# Patient Record
Sex: MALE | Race: WHITE | NOT HISPANIC OR LATINO | Employment: FULL TIME | URBAN - METROPOLITAN AREA
[De-identification: names, ages, dates, MRNs, and addresses within clinical notes are randomized per-mention and may not be internally consistent; named-entity substitution may affect disease eponyms.]

---

## 2017-02-09 ENCOUNTER — OFFICE VISIT (OUTPATIENT)
Dept: FAMILY MEDICINE | Facility: CLINIC | Age: 47
End: 2017-02-09
Payer: COMMERCIAL

## 2017-02-09 VITALS
WEIGHT: 212.2 LBS | SYSTOLIC BLOOD PRESSURE: 123 MMHG | HEART RATE: 62 BPM | BODY MASS INDEX: 29.71 KG/M2 | HEIGHT: 71 IN | TEMPERATURE: 96.7 F | DIASTOLIC BLOOD PRESSURE: 82 MMHG

## 2017-02-09 DIAGNOSIS — G43.109 MIGRAINE WITH AURA AND WITHOUT STATUS MIGRAINOSUS, NOT INTRACTABLE: ICD-10-CM

## 2017-02-09 DIAGNOSIS — L91.8 SKIN TAG: ICD-10-CM

## 2017-02-09 DIAGNOSIS — S89.91XA RIGHT KNEE INJURY, INITIAL ENCOUNTER: Primary | ICD-10-CM

## 2017-02-09 PROCEDURE — 99213 OFFICE O/P EST LOW 20 MIN: CPT | Mod: 25 | Performed by: PHYSICIAN ASSISTANT

## 2017-02-09 PROCEDURE — 11200 RMVL SKIN TAGS UP TO&INC 15: CPT | Performed by: PHYSICIAN ASSISTANT

## 2017-02-09 RX ORDER — SUMATRIPTAN 100 MG/1
100 TABLET, FILM COATED ORAL
Qty: 9 TABLET | Refills: 11 | Status: SHIPPED | OUTPATIENT
Start: 2017-02-09 | End: 2018-02-15

## 2017-02-09 NOTE — PROGRESS NOTES
"  SUBJECTIVE:                                                    Mazin Herbert is a 46 year old male who presents to clinic today for the following health issues:      Medication Followup of SUMAtriptan (IMITREX) 100 MG tablet    Taking Medication as prescribed: yes-takes PRN; Getting migraines \"twice a month, up to 8 times a month\"     Side Effects:  None    Medication Helping Symptoms:  yes   Generally averages 2 per month but varies.  With dark aura.    Referral Request   Right knee pain;   Recent injury from exercising.   \"cracking\" and \"popping\" with movement  No pain; no causing interference with ADL's     Doesn't recall specifics of injuring knee, was wt lifting with leg wt machine and was drinking at the time.  Tiny bit of pain but mostly the sounds, and maybe feels slightly weaker.    -Pt offered the VENECIA/Phq9 and declined.   Mood doing well.  Doesn't drink typically - none for the 6 mo prior to this knee injury.  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    -Offered flu vaccine and pt declined     -Pt also mentioned that he would like skin tags removed today.   Bother him with toweling off, working out, feeling things rub.    Problem list and histories reviewed & adjusted, as indicated.  Additional history: as documented    Problem list, Medication list, Allergies, and Medical/Social/Surgical histories reviewed in EPIC and updated as appropriate.    ROS:  Constitutional, musculoskeletal, neuro, psych systems are negative, except as otherwise noted.    OBJECTIVE:                                                    /82 mmHg  Pulse 62  Temp(Src) 96.7  F (35.9  C) (Oral)  Ht 5' 10.5\" (1.791 m)  Wt 212 lb 3.2 oz (96.253 kg)  BMI 30.01 kg/m2  Body mass index is 30.01 kg/(m^2).  GENERAL: healthy, alert and no distress  SKIN: 5 skin tags R axilla, 3 L axilla, 1 L upper arm.  All are tiny, < 0.3 cm     ASSESSMENT/PLAN:                                   "                      ICD-10-CM    1. Right knee injury, initial encounter S89.91XA ORTHO  REFERRAL   2. Migraine with aura and without status migrainosus, not intractable G43.109 SUMAtriptan (IMITREX) 100 MG tablet   3. Skin tag L91.8 REMOVAL OF SKIN TAGS, FIRST 15   Skin tags cleansed with alcohol.  Topical anesthesia with ethyl chloride.  Removed by shave technique.  Patient declines bandaging.    Patient Instructions   Refilled migraine med    Sports medicine referral - they call you  You could give this a few weeks to see if symptoms resolve      Skin tags removed today        Karen Marquez PA-C  [facility]

## 2017-02-09 NOTE — PATIENT INSTRUCTIONS
Refilled migraine med    Sports medicine referral - they call you  You could give this a few weeks to see if symptoms resolve      Skin tags removed today

## 2017-02-09 NOTE — MR AVS SNAPSHOT
After Visit Summary   2/9/2017    Mazin Herbret    MRN: [id number]           Patient Information     Date Of Birth          1970        Visit Information        Provider Department      2/9/2017 11:20 AM Karen Marquez PA-C [facility]        Today's Diagnoses     Right knee injury, initial encounter    -  1     Migraine with aura and without status migrainosus, not intractable         Skin tag           Care Instructions    Refilled migraine med    Sports medicine referral - they call you  You could give this a few weeks to see if symptoms resolve      Skin tags removed today        Follow-ups after your visit        Additional Services     ORTHO  REFERRAL       [facility] is referring you to the Orthopedic  Services at [facility].       The  Representative will assist you in the coordination of your Orthopedic and Musculoskeletal Care as prescribed by your physician.    The  Representative will call you within 1 business day to help schedule your appointment, or you may contact the  Representative at:    All areas ~ [phone]     Type of Referral : Non Surgical       Timeframe requested: Routine    Coverage of these services is subject to the terms and limitations of your health insurance plan.  Please call member services at your health plan with any benefit or coverage questions.      If X-rays, CT or MRI's have been performed, please contact the facility where they were done to arrange for , prior to your scheduled appointment.  Please bring this referral request to your appointment and present it to your specialist.                  Who to contact     If you have questions or need follow up information about today's clinic visit or your schedule please contact [facility] directly at [phone].  Normal or non-critical lab and imaging results  "will be communicated to you by MyChart, letter or phone within 4 business days after the clinic has received the results. If you do not hear from us within 7 days, please contact the clinic through [employer] or phone. If you have a critical or abnormal lab result, we will notify you by phone as soon as possible.  Submit refill requests through [employer] or call your pharmacy and they will forward the refill request to us. Please allow 3 business days for your refill to be completed.          Additional Information About Your Visit        [employer] Information     [employer] lets you send messages to your doctor, view your test results, renew your prescriptions, schedule appointments and more. To sign up, go to [email or web address] . Click on \"Log in\" on the left side of the screen, which will take you to the Welcome page. Then click on \"Sign up Now\" on the right side of the page.     You will be asked to enter the access code listed below, as well as some personal information. Please follow the directions to create your username and password.     Your access code is: [email or web address]  Expires: 5/10/2017 11:46 AM     Your access code will  in 90 days. If you need help or a new code, please call your [place] clinic or [phone].        Care EveryWhere ID     This is your Care EveryWhere ID. This could be used by other organizations to access your [place] medical records  [id number]        Your Vitals Were     Pulse Temperature Height BMI (Body Mass Index)          62 96.7  F (35.9  C) (Oral) 5' 10.5\" (1.791 m) 30.01 kg/m2         Blood Pressure from Last 3 Encounters:   17 123/82   07/27/15 110/82   14 115/81    Weight from Last 3 Encounters:   17 212 lb 3.2 oz (96.253 kg)   07/27/15 215 lb (97.523 kg)   14 222 lb 6.4 oz (100.88 kg)              We Performed the Following     ORTHO  REFERRAL     REMOVAL OF SKIN TAGS, FIRST 15          Where to get your medicines      These " medications were sent to [facility] [address] [place] [address]  [address] [place] [address]     Phone:  [phone]    - SUMAtriptan 100 MG tablet       Primary Care Provider Office Phone # Fax #    Karen Marquez PA-C [phone]       [facility] [address] [place] [address]        Thank you!     Thank you for choosing [facility]  for your care. Our goal is always to provide you with excellent care. Hearing back from our patients is one way we can continue to improve our services. Please take a few minutes to complete the written survey that you may receive in the mail after your visit with us. Thank you!             Your Updated Medication List - Protect others around you: Learn how to safely use, store and throw away your medicines at www.disposemymeds.org.          This list is accurate as of: 2/9/17 11:46 AM.  Always use your most recent med list.                   Brand Name Dispense Instructions for use    SUMAtriptan 100 MG tablet    IMITREX    9 tablet    Take 1 tablet (100 mg) by mouth at onset of headache for migraine May repeat in 2 hours if needed: max 2/day.

## 2017-02-09 NOTE — NURSING NOTE
"Chief Complaint   Patient presents with     Medication Request       Initial /82 mmHg  Pulse 62  Temp(Src) 96.7  F (35.9  C) (Oral)  Ht 5' 10.5\" (1.791 m)  Wt 212 lb 3.2 oz (96.253 kg)  BMI 30.01 kg/m2 Estimated body mass index is 30.01 kg/(m^2) as calculated from the following:    Height as of this encounter: 5' 10.5\" (1.791 m).    Weight as of this encounter: 212 lb 3.2 oz (96.253 kg).  Medication Reconciliation: complete    "

## 2017-02-21 ENCOUNTER — OFFICE VISIT (OUTPATIENT)
Dept: ORTHOPEDICS | Facility: CLINIC | Age: 47
End: 2017-02-21
Payer: COMMERCIAL

## 2017-02-21 ENCOUNTER — RADIANT APPOINTMENT (OUTPATIENT)
Dept: GENERAL RADIOLOGY | Facility: CLINIC | Age: 47
End: 2017-02-21
Attending: FAMILY MEDICINE
Payer: COMMERCIAL

## 2017-02-21 VITALS
WEIGHT: 212 LBS | DIASTOLIC BLOOD PRESSURE: 80 MMHG | SYSTOLIC BLOOD PRESSURE: 120 MMHG | HEIGHT: 71 IN | BODY MASS INDEX: 29.68 KG/M2

## 2017-02-21 DIAGNOSIS — M25.561 ACUTE PAIN OF RIGHT KNEE: Primary | ICD-10-CM

## 2017-02-21 DIAGNOSIS — M25.561 ACUTE PAIN OF RIGHT KNEE: ICD-10-CM

## 2017-02-21 PROCEDURE — 73562 X-RAY EXAM OF KNEE 3: CPT | Mod: RT

## 2017-02-21 PROCEDURE — 99203 OFFICE O/P NEW LOW 30 MIN: CPT | Performed by: FAMILY MEDICINE

## 2017-02-21 NOTE — PROGRESS NOTES
"Mazin Herbert  :  1970  DOS: 2017  MRN: [id number]    Sports Medicine Clinic Visit    PCP: Karen Marquez    Mazin Herbert is a 46 year old male who is seen in consultation at the request of  Karen Marquez presenting with right knee pain.    Injury: Intoxicated - performing hamstring curl on machine, noted \"sharp popping\" sensation in posterior knee ~ 3 weeks ago (17), now having \"popping\" with knee flexion activity.  Pain located over right deep anterior knee, nonradiating.  Additional Features:  Positive: swelling and popping.  Symptoms are better with Rest.  Symptoms are worse with: knee flexion, walking, stairs.  Other evaluation and/or treatments so far consists of: Nothing.  Recent imaging completed: No recent imaging completed.  Prior History of related problems: none    Second complaint of left ankle, heel pain over last 5+ years.  Pain located over achilles tendon and posterior medial ankle.  Notes numbness/tingling in heel.  Pain worse with prolonged standing, walking.  No recent treatment completed.  H/o left ankle injury while he was working ~ 5 years ago - notes rested for ~ 3 days following injury.     Social History: unemployed    Review of Systems  Musculoskeletal: as above  Remainder of review of systems is negative including constitutional, CV, pulmonary, GI, Skin and Neurologic except as noted in HPI or medical history.    Past Medical History   Diagnosis Date     Depressive disorder      Past Surgical History   Procedure Laterality Date     Tonsillectomy         Objective  /80  Ht 5' 10.5\" (1.791 m)  Wt 212 lb (96.2 kg)  BMI 29.99 kg/m2    General: healthy, alert and in no distress    HEENT: no scleral icterus or conjunctival erythema   Skin: no suspicious lesions or rash. No jaundice.   CV: regular rhythm by palpation, 2+ distal pulses, no pedal edema    Resp: normal respiratory effort without conversational dyspnea   Psych: normal mood and " affect    Gait: nonantalgic, appropriate coordination and balance   Neuro: normal light touch sensory exam of the extremities. Motor strength as noted below     Right Knee exam    ROM:        Full active and passive ROM with flexion and extension, mild pain in terminal extension    Inspection:       no visible ecchymosis       no visible edema or effusion    Skin:       no visible deformities       well perfused       capillary refill brisk    Patellar Motion:        Normal patellar tracking noted through range of motion, mild crepitus    Tender:        Both distal muscle bellies and hamstring tendons       Mild pver medial and latera and superior patellar facets    Non Tender:         remainder of knee area        medial joint line        lateral joint line        along MCL        distal IT Band        infrapatellar tendon        tibial tubercle       pes anserine bursa    Special Tests:        neg (-) Mya       neg (-) Lachman       neg (-) anterior drawer       neg (-) posterior drawer       neg (-) varus at 0 deg and 30 deg       neg (-) valgus at 0 deg and 30 deg       + PF grind    Evaluation of ipsilateral kinetic chain       normal strength with hip extension and abduction       normal medial longitudinal arch in both feet    Radiology  XR KNEE BILATERAL 1/2 VW 2/21/2017 8:54 AM      HISTORY: Right knee pain.         IMPRESSION: Negative AP and sunrise views of both knees.    Assessment:  1. Acute pain of right knee        Plan:  Discussed the assessment with the patient.  Follow up: prn  HEP provided for chronic ankle pain s/p injury and for mild PFS sx  No concern on exam regarding stability  PT offered, an order any time  Use of short 1-2 wk course of NSAIDs reviewed, dosing and precautions reviewed if utilized  We discussed modified progressive pain-free activity as tolerated  RICE reviewed, as well as consideration of compression sleeve for thigh or knee  Home handouts provided and supportive care  reviewed  All questions were answered today  Contact us with additional questions or concerns  Signs and sx of concern reviewed    Thanks very much for sending this nice gentleman to us, I will keep you updated with his progress      Sage Negro DO, CAJOCELYN  Primary Care Sports Medicine  [facility]             Disclaimer: This note consists of symbols derived from keyboarding, dictation and/or voice recognition software. As a result, there may be errors in the script that have gone undetected. Please consider this when interpreting information found in this chart.

## 2017-02-21 NOTE — MR AVS SNAPSHOT
"              After Visit Summary   2017    Mazin Herbert    MRN: [id number]           Patient Information     Date Of Birth          1970        Visit Information        Provider Department      2017 8:20 AM Saeg Negro,  [facility]        Today's Diagnoses     Acute pain of right knee    -  1       Follow-ups after your visit        Who to contact     If you have questions or need follow up information about today's clinic visit or your schedule please contact [facility] directly at [phone].  Normal or non-critical lab and imaging results will be communicated to you by [employer], letter or phone within 4 business days after the clinic has received the results. If you do not hear from us within 7 days, please contact the clinic through [employer] or phone. If you have a critical or abnormal lab result, we will notify you by phone as soon as possible.  Submit refill requests through [employer] or call your pharmacy and they will forward the refill request to us. Please allow 3 business days for your refill to be completed.          Additional Information About Your Visit        MyChart Information     [employer] lets you send messages to your doctor, view your test results, renew your prescriptions, schedule appointments and more. To sign up, go to [email or web address] . Click on \"Log in\" on the left side of the screen, which will take you to the Welcome page. Then click on \"Sign up Now\" on the right side of the page.     You will be asked to enter the access code listed below, as well as some personal information. Please follow the directions to create your username and password.     Your access code is: [email or web address]  Expires: 5/10/2017 11:46 AM     Your access code will  in 90 days. If you need help or a new code, please call your [place] clinic or [phone].        Care EveryWhere ID     This is your " "Care EveryWhere ID. This could be used by other organizations to access your [place] medical records  [id number]        Your Vitals Were     Height BMI (Body Mass Index)                5' 10.5\" (1.791 m) 29.99 kg/m2           Blood Pressure from Last 3 Encounters:   02/21/17 120/80   02/09/17 123/82   07/27/15 110/82    Weight from Last 3 Encounters:   02/21/17 212 lb (96.2 kg)   02/09/17 212 lb 3.2 oz (96.3 kg)   07/27/15 215 lb (97.5 kg)               Primary Care Provider Office Phone # Fax #    Karen Marquez PA-C [phone]       [facility] [address] [place] [address]        Thank you!     Thank you for choosing [facility]  for your care. Our goal is always to provide you with excellent care. Hearing back from our patients is one way we can continue to improve our services. Please take a few minutes to complete the written survey that you may receive in the mail after your visit with us. Thank you!             Your Updated Medication List - Protect others around you: Learn how to safely use, store and throw away your medicines at www.disposemymeds.org.          This list is accurate as of: 2/21/17 11:59 PM.  Always use your most recent med list.                   Brand Name Dispense Instructions for use    SUMAtriptan 100 MG tablet    IMITREX    9 tablet    Take 1 tablet (100 mg) by mouth at onset of headache for migraine May repeat in 2 hours if needed: max 2/day.         "

## 2017-08-17 ENCOUNTER — OFFICE VISIT (OUTPATIENT)
Dept: FAMILY MEDICINE | Facility: CLINIC | Age: 47
End: 2017-08-17
Payer: COMMERCIAL

## 2017-08-17 VITALS
SYSTOLIC BLOOD PRESSURE: 120 MMHG | DIASTOLIC BLOOD PRESSURE: 86 MMHG | TEMPERATURE: 97.7 F | WEIGHT: 206 LBS | HEIGHT: 71 IN | HEART RATE: 69 BPM | BODY MASS INDEX: 28.84 KG/M2

## 2017-08-17 DIAGNOSIS — M25.512 ACUTE PAIN OF LEFT SHOULDER: Primary | ICD-10-CM

## 2017-08-17 PROCEDURE — 99213 OFFICE O/P EST LOW 20 MIN: CPT | Performed by: PHYSICIAN ASSISTANT

## 2017-08-17 NOTE — MR AVS SNAPSHOT
After Visit Summary   8/17/2017    Mazin Herbert    MRN: [id number]           Patient Information     Date Of Birth          1970        Visit Information        Provider Department      8/17/2017 10:00 AM Karen Marquez PA-C [facility]        Today's Diagnoses     Acute pain of left shoulder    -  1      Care Instructions    I think need Physical Therapy to heal this.  I suggest Physical Therapy and then if not improving, sports med or MRI.  Do not think MRI appropriate at this time.  Suggest seeing sports med for 2nd opinion if really wanting MRI before Physical Therapy.    Suggest OTC tylenol, ibuprofen or aleve if needed for pain or sleep.  Take with food to protect belly.    Call if questions.          Follow-ups after your visit        Additional Services     ORTHO  REFERRAL       [facility] is referring you to the Orthopedic  Services at [facility].       The  Representative will assist you in the coordination of your Orthopedic and Musculoskeletal Care as prescribed by your physician.    The  Representative will call you within 1 business day to help schedule your appointment, or you may contact the  Representative at:    All areas ~ [phone]     Type of Referral : Non Surgical       Timeframe requested: Routine    Coverage of these services is subject to the terms and limitations of your health insurance plan.  Please call member services at your health plan with any benefit or coverage questions.      If X-rays, CT or MRI's have been performed, please contact the facility where they were done to arrange for , prior to your scheduled appointment.  Please bring this referral request to your appointment and present it to your specialist.            PHYSICAL THERAPY REFERRAL       *This therapy referral will be filtered to a centralized scheduling office at  "[facility] and the patient will receive a call to schedule an appointment at a [place] location most convenient for them. *     [facility] provides Physical Therapy evaluation and treatment and many specialty services across the [place] system.  If requesting a specialty program, please choose from the list below.    If you have not heard from the scheduling office within 2 business days, please call [phone] for all locations, with the exception of [place], please call [phone].  Treatment: Evaluation & Treatment  Special Instructions/Modalities: eval and treat  Special Programs: None    Please be aware that coverage of these services is subject to the terms and limitations of your health insurance plan.  Call member services at your health plan with any benefit or coverage questions.      **Note to Provider:  If you are referring outside of [place] for the therapy appointment, please list the name of the location in the \"special instructions\" above, print the referral and give to the patient to schedule the appointment.                  Who to contact     If you have questions or need follow up information about today's clinic visit or your schedule please contact [facility] directly at [phone].  Normal or non-critical lab and imaging results will be communicated to you by MyChart, letter or phone within 4 business days after the clinic has received the results. If you do not hear from us within 7 days, please contact the clinic through [employer] or phone. If you have a critical or abnormal lab result, we will notify you by phone as soon as possible.  Submit refill requests through [employer] or call your pharmacy and they will forward the refill request to us. Please allow 3 business days for your refill to be completed.          Additional Information About Your Visit        [employer] Information     [employer] lets you send messages to your " "doctor, view your test results, renew your prescriptions, schedule appointments and more. To sign up, go to [email or web address] . Click on \"Log in\" on the left side of the screen, which will take you to the Welcome page. Then click on \"Sign up Now\" on the right side of the page.     You will be asked to enter the access code listed below, as well as some personal information. Please follow the directions to create your username and password.     Your access code is: [email or web address]  Expires: 11/15/2017 10:06 AM     Your access code will  in 90 days. If you need help or a new code, please call your [place] clinic or [phone].        Care EveryWhere ID     This is your Care EveryWhere ID. This could be used by other organizations to access your [place] medical records  [id number]        Your Vitals Were     Pulse Temperature Height BMI (Body Mass Index)          69 97.7  F (36.5  C) (Tympanic) 5' 10.5\" (1.791 m) 29.14 kg/m2         Blood Pressure from Last 3 Encounters:   17 120/86   17 120/80   17 123/82    Weight from Last 3 Encounters:   17 206 lb (93.4 kg)   17 212 lb (96.2 kg)   17 212 lb 3.2 oz (96.3 kg)              We Performed the Following     ORTHO  REFERRAL     PHYSICAL THERAPY REFERRAL        Primary Care Provider Office Phone # Fax #    Karen Marquez PA-C [phone] [address] [place] [address]        Equal Access to Services     [facility] Center[facility]ONARDA : Hadii mirela Landrum, michelle milner, qaalberto hamilton . So [facility] [phone].    ATENCIÓN: Si habla español, tiene a guardado disposición servicios gratuitos de asistencia lingüística. Llame al [phone].    We comply with applicable federal civil rights laws and [place] laws. We do not discriminate on the basis of race, color, national origin, age, disability sex, sexual orientation or gender identity.       "      Thank you!     Thank you for choosing [facility]  for your care. Our goal is always to provide you with excellent care. Hearing back from our patients is one way we can continue to improve our services. Please take a few minutes to complete the written survey that you may receive in the mail after your visit with us. Thank you!             Your Updated Medication List - Protect others around you: Learn how to safely use, store and throw away your medicines at www.disposemymeds.org.          This list is accurate as of: 8/17/17 10:06 AM.  Always use your most recent med list.                   Brand Name Dispense Instructions for use Diagnosis    SUMAtriptan 100 MG tablet    IMITREX    9 tablet    Take 1 tablet (100 mg) by mouth at onset of headache for migraine May repeat in 2 hours if needed: max 2/day.    Migraine with aura and without status migrainosus, not intractable

## 2017-08-17 NOTE — NURSING NOTE
"Chief Complaint   Patient presents with     Shoulder Pain       Initial /86 (BP Location: Right arm, Patient Position: Chair, Cuff Size: Adult Large)  Pulse 69  Temp 97.7  F (36.5  C) (Tympanic)  Ht 5' 10.5\" (1.791 m)  Wt 206 lb (93.4 kg)  BMI 29.14 kg/m2 Estimated body mass index is 29.14 kg/(m^2) as calculated from the following:    Height as of this encounter: 5' 10.5\" (1.791 m).    Weight as of this encounter: 206 lb (93.4 kg).  Medication Reconciliation: complete    Health Maintenance that is potentially due pending provider review:  NONE        Is there anyone who you would like to be able to receive your results? No  If yes have patient fill out PRICE      "

## 2017-08-17 NOTE — PATIENT INSTRUCTIONS
I think need Physical Therapy to heal this.  I suggest Physical Therapy and then if not improving, sports med or MRI.  Do not think MRI appropriate at this time.  Suggest seeing sports med for 2nd opinion if really wanting MRI before Physical Therapy.    Suggest OTC tylenol, ibuprofen or aleve if needed for pain or sleep.  Take with food to protect belly.    Call if questions.

## 2017-08-17 NOTE — PROGRESS NOTES
"  SUBJECTIVE:                                                    Mazin Herbert is a 47 year old male who presents to clinic today for the following health issues:      Musculoskeletal problem/pain      Duration: 4 weeks    Description  Location: Left shoulder    Intensity:  moderate    Accompanying signs and symptoms: radiation of pain to arm.  Pain while raising arm    History  Previous similar problem: no   Previous evaluation:  none    Precipitating or alleviating factors:  Trauma or overuse: no   Aggravating factors include: lifting and overuse    Therapies tried and outcome: nothing    Hard to sleep on shoulder  Had son (70 pounds) on shoulders at [place] recently, unsure if this caused, unsure of whether pain started before or after.  Notes clicking of shoulder, painless.  Pain with bench-pressing like movements above head.  R handed.      Unemployed.  Does not want to repeat phq/carolyn for depression as he feels this is very remote issue.    Problem list and histories reviewed & adjusted, as indicated.  Additional history: as documented    Reviewed and updated as needed this visit by clinical staffTobacco  Allergies  Meds  Problems  Med Hx  Surg Hx  Fam Hx  Soc Hx        Reviewed and updated as needed this visit by Provider  Allergies  Meds  Problems         ROS:  Constitutional, musculoskeletal, systems are negative, except as otherwise noted.      OBJECTIVE:   /86 (BP Location: Right arm, Patient Position: Chair, Cuff Size: Adult Large)  Pulse 69  Temp 97.7  F (36.5  C) (Tympanic)  Ht 5' 10.5\" (1.791 m)  Wt 206 lb (93.4 kg)  BMI 29.14 kg/m2  Body mass index is 29.14 kg/(m^2).  GENERAL: healthy, alert and no distress  L Shoulder: There is no tenderness or crepitus.  Active ROM is normal.  At far range of normal shoulder supination/ext rotation patient gets fair click sound.  No arm drop.  Supraspinatus empty can test normal.  Subscapular lift off test normal.  Abduction with normal " strength.  Neers and Dinh impingement test normal.  Crossover test negative.  No bicepital groove tenderness.      ASSESSMENT/PLAN:       ICD-10-CM    1. Acute pain of left shoulder M25.512 ORTHO  REFERRAL     PHYSICAL THERAPY REFERRAL   Patient wants MRI.  20 min spent in visit, over half in discussion that Physical Therapy is more appropriate place to start.    Patient Instructions   I think need Physical Therapy to heal this.  I suggest Physical Therapy and then if not improving, sports med or MRI.  Do not think MRI appropriate at this time.  Suggest seeing sports med for 2nd opinion if really wanting MRI before Physical Therapy.    Suggest OTC tylenol, ibuprofen or aleve if needed for pain or sleep.  Take with food to protect belly.    Call if questions.      Karen Marquez PA-C  [facility]

## 2017-08-23 ENCOUNTER — HOSPITAL ENCOUNTER (OUTPATIENT)
Dept: PHYSICAL THERAPY | Facility: CLINIC | Age: 47
Setting detail: THERAPIES SERIES
End: 2017-08-23
Attending: PHYSICIAN ASSISTANT
Payer: COMMERCIAL

## 2017-08-23 PROCEDURE — 40000718 ZZHC STATISTIC PT DEPARTMENT ORTHO VISIT

## 2017-08-23 PROCEDURE — 97110 THERAPEUTIC EXERCISES: CPT | Mod: GP

## 2017-08-23 PROCEDURE — 97535 SELF CARE MNGMENT TRAINING: CPT | Mod: GP

## 2017-08-23 PROCEDURE — 97161 PT EVAL LOW COMPLEX 20 MIN: CPT | Mod: GP

## 2017-08-23 NOTE — PROGRESS NOTES
" 08/23/17 0700   General Information   Type of Visit Initial OP Ortho PT Evaluation   Start of Care Date 08/23/17   Referring Physician Karen Marquez PA-C    Patient/Family Goals Statement don shirt, wash hair, learn what to do and not to do to avoid injury   Orders Evaluate and Treat   Date of Order 08/17/17   Insurance Type [employer]   Insurance Comments/Visits Authorized HP: NO GROUP THERAPY COVERAGE, NO COMMUNITY/WORK REINTEGRATION AND IADLS   Medical Diagnosis Acute pain of left shoulder   Surgical/Medical history reviewed Yes   Precautions/Limitations no known precautions/limitations   General Information Comments PMH: Migraine with aura and without status migrainosus (not intractable), TMJ, Mild major depression, Generalized anxiety disorder   Body Part(s)   Body Part(s) Shoulder   Presentation and Etiology   Pertinent history of current problem (include personal factors and/or comorbidities that impact the POC) Per Jimmy DILL note 8/17/17: \"Hard to sleep on shoulder. Had son (70 pounds) on shoulders at [place] recently, unsure if this caused, unsure of whether pain started before or after. Notes clicking of shoulder, painless. Pain with bench-pressing like movements above head. R handed.\" Been getting worse in L shoulder sinse 1 month ago getting a lot better over last week with tigerbalm/biofreeze spray with less activity. Only been doing cardio and standard push ups. Pt had been pushing up with barbells behind head. Pain with abduction >90*. Pt not sure if will return after this 1st physical therapy visit.   Impairments A. Pain;B. Decreased WB tolerance;D. Decreased ROM;F. Decreased strength and endurance   Symptom Location sup shoulder into lateral delt   How/Where did it occur From insidious onset   Onset date of current episode/exacerbation 07/25/17   Chronicity New   Pain rating (0-10 point scale) Best (/10);Worst (/10)   Best (/10) 5   Worst (/10) 9   Pain quality A. Sharp;C. Aching "   Frequency of pain/symptoms C. With activity   Pain/symptoms exacerbated by H. Overhead reach   Pain/symptoms eased by E. Changing positions;F. Certain positions;G. Heat;I. OTC medication(s)   Current Level of Function   Patient role/employment history E. 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 No   Per patient - Fall with injury in past year? No   Is patient a fall risk? No   Functional Scales   Functional Scales Other   Other Scales  SPADI: 46.9%   Shoulder Objective Findings   Side (if bilateral, select both right and left) Left   Palpation Non-tender L shoulder/RC complex   Accessory Motion/Joint Mobility - Alexander's, - Irish's   Left Shoulder Flexion AROM 146   Left Shoulder Abduction AROM 163 (pain at 122*)   Left Shoulder ER AROM 71   Left Shoulder IR AROM 58   Left Shoulder Flexion Strength 5   Left Shoulder Abduction Strength 3+, pain   Left Shoulder ER Strength 3+   Left Shoulder IR Strength 4+, minimal pain   Left Lower Trapezius Strength 3+   Planned Therapy Interventions   Planned Therapy Interventions ADL retraining;ROM;strengthening;manual therapy;joint mobilization;stretching   Planned Modality Interventions   Planned Modality Interventions Cryotherapy   Clinical Impression   Criteria for Skilled Therapeutic Interventions Met yes, treatment indicated   PT Diagnosis L shoulder primary impingement   Influenced by the following impairments primary impingement, ROM, strength   Functional limitations due to impairments don shirt, wash hair, learn what to do and not to do to avoid injury   Clinical Presentation Stable/Uncomplicated   Clinical Presentation Rationale (+) sub acute, good motivation (-) limited therapy attendence likely   Clinical Decision Making (Complexity) Low complexity   Therapy Frequency 1 time/week   Predicted Duration of Therapy  Intervention (days/wks) 4 weeks   Risk & Benefits of therapy have been explained Yes   Patient, Family & other staff in agreement with plan of care Yes   Clinical Impression Comments Pt is a quiet man with recent L shoulder pain about 1 month ago with decreased function prior to topical edema reduction products over last week. Pt presents with primary L shoulder impingement per severe pain with abd, impaired L GHJ mechanics per ROM, RC weakness per MMT. Pt is appropriate for skilled PT to address impairments and improve function in L shoulder.   Education Assessment   Preferred Learning Style Listening;Demonstration   Barriers to Learning No barriers   ORTHO GOALS   PT Ortho Eval Goals 1;2;3;4   Ortho Goal 1   Goal Identifier learn what to do and not to do to avoid injury   Goal Description Following PT interventions, pt will verbilize understanding for pt instruction to learn what to do and not to do to avoid injury   Target Date 08/23/17   Date Met 08/23/17   Ortho Goal 2   Goal Identifier wash hair   Goal Description Following PT interventions, pt will have pain free AROM in L shoulder abd to be able to wash hair,   Target Date 09/06/17   Ortho Goal 3   Goal Identifier don shirt   Goal Description Following PT interventions, pt will score 60% on SPADI to be able to don shirt with less pain   Target Date 09/20/17   Total Evaluation Time   Total Evaluation Time 25min   Shantanu Francis, PT, DPT   Doctor of Physical Therapy # [id number]  [facility]  [phone]

## 2017-10-05 NOTE — DISCHARGE SUMMARY
Outpatient Physical Therapy Discharge Note     Patient: Mazin Herbert  : 1970    Beginning/End Dates of Reporting Period:  17    Referring Provider: Karen Marquez PA-C     Therapy Diagnosis: L shoulder primary impingement     Client Self Report: See eval. Pt may not return after this one visit    Objective Measurements:  Objective Measure: L shoulder AROM  Details: see eval  Objective Measure: MMT  Details:  L shoulder ER MMT after tx: 4+/5          Outcome Measures (most recent score):  Patient did not attend follow up visit, status unknown at this time.      Goals:  Goal Identifier learn what to do and not to do to avoid injury   Goal Description Following PT interventions, pt will verbilize understanding for pt instruction to learn what to do and not to do to avoid injury   Target Date 17   Date Met  17   Progress:     Goal Identifier wash hair   Goal Description Following PT interventions, pt will have pain free AROM in L shoulder abd to be able to wash hair,   Target Date 17   Date Met      Progress:     Goal Identifier don shirt   Goal Description Following PT interventions, pt will score 60% on SPADI to be able to don shirt with less pain   Target Date 17   Date Met      Progress:     Goal Identifier     Goal Description     Target Date     Date Met      Progress:     Goal Identifier     Goal Description     Target Date     Date Met      Progress:     Goal Identifier     Goal Description     Target Date     Date Met      Progress:     Goal Identifier     Goal Description     Target Date     Date Met      Progress:     Goal Identifier     Goal Description     Target Date     Date Met      Progress:     Progress Toward Goals:   Progress this reporting period: Patient participated in 1 skilled PT treatment session.    Patient did not return for follow up treatments as directed. Last skilled PT treatment visit was more than 30 days ago. Goal status and  current objective information is therefore unknown.  Discharge from PT services at this time for this episode of treatment. Please see attached documentation under this episode of care for further information including dates of service, start of care date, referring physician, diagnosis, treatment plan, treatments, etc.    Please contact me with any questions or concerns.    Thank you for your referral.    Shantanu Francis, PT, DPT  Doctor of Physical Therapy  [facility]  [phone]              Plan:  Discharge from therapy.    Discharge:    Reason for Discharge: Patient chooses to discontinue therapy.  Patient has failed to schedule further appointments.    Equipment Issued: na    Discharge Plan: Patient to continue home program.

## 2018-02-15 ENCOUNTER — OFFICE VISIT (OUTPATIENT)
Dept: FAMILY MEDICINE | Facility: CLINIC | Age: 48
End: 2018-02-15
Payer: COMMERCIAL

## 2018-02-15 VITALS
SYSTOLIC BLOOD PRESSURE: 124 MMHG | TEMPERATURE: 98.4 F | HEIGHT: 71 IN | OXYGEN SATURATION: 97 % | WEIGHT: 217 LBS | HEART RATE: 97 BPM | DIASTOLIC BLOOD PRESSURE: 80 MMHG | RESPIRATION RATE: 16 BRPM | BODY MASS INDEX: 30.38 KG/M2

## 2018-02-15 DIAGNOSIS — Z23 NEED FOR PROPHYLACTIC VACCINATION AND INOCULATION AGAINST INFLUENZA: ICD-10-CM

## 2018-02-15 DIAGNOSIS — J20.9 ACUTE BRONCHITIS WITH SYMPTOMS > 10 DAYS: Primary | ICD-10-CM

## 2018-02-15 DIAGNOSIS — G43.109 MIGRAINE WITH AURA AND WITHOUT STATUS MIGRAINOSUS, NOT INTRACTABLE: ICD-10-CM

## 2018-02-15 DIAGNOSIS — H57.9 EYE PROBLEM: ICD-10-CM

## 2018-02-15 PROCEDURE — 99214 OFFICE O/P EST MOD 30 MIN: CPT | Mod: 25 | Performed by: PHYSICIAN ASSISTANT

## 2018-02-15 PROCEDURE — 90686 IIV4 VACC NO PRSV 0.5 ML IM: CPT | Performed by: PHYSICIAN ASSISTANT

## 2018-02-15 PROCEDURE — 90471 IMMUNIZATION ADMIN: CPT | Performed by: PHYSICIAN ASSISTANT

## 2018-02-15 RX ORDER — SUMATRIPTAN 100 MG/1
100 TABLET, FILM COATED ORAL
Qty: 9 TABLET | Refills: 3 | Status: SHIPPED | OUTPATIENT
Start: 2018-02-15 | End: 2018-10-11

## 2018-02-15 RX ORDER — AZITHROMYCIN 250 MG/1
TABLET, FILM COATED ORAL
Qty: 6 TABLET | Refills: 0 | Status: SHIPPED | OUTPATIENT
Start: 2018-02-15 | End: 2018-10-11

## 2018-02-15 ASSESSMENT — ANXIETY QUESTIONNAIRES: GAD7 TOTAL SCORE: INCOMPLETE

## 2018-02-15 ASSESSMENT — PATIENT HEALTH QUESTIONNAIRE - PHQ9
SUM OF ALL RESPONSES TO PHQ QUESTIONS 1-9: 2
SUM OF ALL RESPONSES TO PHQ QUESTIONS 1-9: 2
10. IF YOU CHECKED OFF ANY PROBLEMS, HOW DIFFICULT HAVE THESE PROBLEMS MADE IT FOR YOU TO DO YOUR WORK, TAKE CARE OF THINGS AT HOME, OR GET ALONG WITH OTHER PEOPLE: NOT DIFFICULT AT ALL

## 2018-02-15 ASSESSMENT — PAIN SCALES - GENERAL: PAINLEVEL: NO PAIN (0)

## 2018-02-15 NOTE — MR AVS SNAPSHOT
"              After Visit Summary   2/15/2018    Mazin Herbert    MRN: [id number]           Patient Information     Date Of Birth          1970        Visit Information        Provider Department      2/15/2018 3:20 PM Karen Marquez PA-C [facility]        Today's Diagnoses     Acute bronchitis with symptoms > 10 days    -  1    Migraine with aura and without status migrainosus, not intractable          Care Instructions    Treat for bronchitis    Refilled migraine med  See eye doctor for what sounds like potential floaters       Flu shot     Consider repeat cholesterol test due to family history.            Follow-ups after your visit        Who to contact     If you have questions or need follow up information about today's clinic visit or your schedule please contact [facility] directly at [phone].  Normal or non-critical lab and imaging results will be communicated to you by MyChart, letter or phone within 4 business days after the clinic has received the results. If you do not hear from us within 7 days, please contact the clinic through MyChart or phone. If you have a critical or abnormal lab result, we will notify you by phone as soon as possible.  Submit refill requests through [employer] or call your pharmacy and they will forward the refill request to us. Please allow 3 business days for your refill to be completed.          Additional Information About Your Visit        MyChart Information     [employer] lets you send messages to your doctor, view your test results, renew your prescriptions, schedule appointments and more. To sign up, go to [email or web address] . Click on \"Log in\" on the left side of the screen, which will take you to the Welcome page. Then click on \"Sign up Now\" on the right side of the page.     You will be asked to enter the access code listed below, as well as some personal information. Please follow the directions to " "create your username and password.     Your access code is: [email or web address]  Expires: 2018  3:57 PM     Your access code will  in 90 days. If you need help or a new code, please call your [facility] clinic or [phone].        Care EveryWhere ID     This is your Care EveryWhere ID. This could be used by other organizations to access your [facility] medical records  [id number]        Your Vitals Were     Pulse Temperature Respirations Height Pulse Oximetry BMI (Body Mass Index)    97 98.4  F (36.9  C) (Tympanic) 16 5' 10.5\" (1.791 m) 97% 30.7 kg/m2       Blood Pressure from Last 3 Encounters:   02/15/18 124/80   17 120/86   17 120/80    Weight from Last 3 Encounters:   02/15/18 217 lb (98.4 kg)   17 206 lb (93.4 kg)   17 212 lb (96.2 kg)              Today, you had the following     No orders found for display         Today's Medication Changes          These changes are accurate as of 2/15/18  3:57 PM.  If you have any questions, ask your nurse or doctor.               Start taking these medicines.        Dose/Directions    azithromycin 250 MG tablet   Commonly known as:  ZITHROMAX   Used for:  Acute bronchitis with symptoms > 10 days   Started by:  Karen Marquez PA-C        Two tablets first day, then one tablet daily for four days.   Quantity:  6 tablet   Refills:  0            Where to get your medicines      These medications were sent to [facility] [place] [address]     Phone:  [phone]     azithromycin 250 MG tablet    SUMAtriptan 100 MG tablet                Primary Care Provider Office Phone # Fax #    Karen Marquez PA-C [phone]       [address] [place], [place] [address]        Equal Access to Services     OFELIA POWELL AH: Conor Landrum, waaxda luqadaha, qaybta kaalmalary lock, alberto hurley. So [facility] " [phone].    ATENCIÓN: Si la nena cano, tiene a guardado disposición servicios gratuitos de asistencia lingüística. Candis ordonez [phone].    We comply with applicable federal civil rights laws and [place] laws. We do not discriminate on the basis of race, color, national origin, age, disability, sex, sexual orientation, or gender identity.            Thank you!     Thank you for choosing [facility]  for your care. Our goal is always to provide you with excellent care. Hearing back from our patients is one way we can continue to improve our services. Please take a few minutes to complete the written survey that you may receive in the mail after your visit with us. Thank you!             Your Updated Medication List - Protect others around you: Learn how to safely use, store and throw away your medicines at www.disposemymeds.org.          This list is accurate as of 2/15/18  3:57 PM.  Always use your most recent med list.                   Brand Name Dispense Instructions for use Diagnosis    azithromycin 250 MG tablet    ZITHROMAX    6 tablet    Two tablets first day, then one tablet daily for four days.    Acute bronchitis with symptoms > 10 days       SUMAtriptan 100 MG tablet    IMITREX    9 tablet    Take 1 tablet (100 mg) by mouth at onset of headache for migraine May repeat in 2 hours if needed: max 2/day.    Migraine with aura and without status migrainosus, not intractable

## 2018-02-15 NOTE — NURSING NOTE
"Chief Complaint   Patient presents with     URI     Recheck Medication       Initial /80 (BP Location: Right arm, Patient Position: Chair, Cuff Size: Adult Large)  Pulse 97  Temp 98.4  F (36.9  C) (Tympanic)  Resp 16  Ht 5' 10.5\" (1.791 m)  Wt 217 lb (98.4 kg)  SpO2 97%  BMI 30.7 kg/m2 Estimated body mass index is 30.7 kg/(m^2) as calculated from the following:    Height as of this encounter: 5' 10.5\" (1.791 m).    Weight as of this encounter: 217 lb (98.4 kg).      Health Maintenance that is potentially due pending provider review:  NONE        Is there anyone who you would like to be able to receive your results? No  If yes have patient fill out PRICE      "

## 2018-02-15 NOTE — PATIENT INSTRUCTIONS
Treat for bronchitis    Refilled migraine med  See eye doctor for what sounds like potential floaters       Flu shot     Consider repeat cholesterol test due to family history.

## 2018-02-15 NOTE — PROGRESS NOTES
"  SUBJECTIVE:   Mazin Herbert is a 47 year old male who presents to clinic today for the following health issues:      Medication Followup of Imitrex    Taking Medication as prescribed: yes    Side Effects:  Fatigue, irritability    Medication Helping Symptoms:  yes     Typically had been doing great, needed imitrex 1-2 x per month, sometimes not at all.  Exercise seems to help reduce migraines \"tremendously\".  Lately more with coughing.      ENT Symptoms             Symptoms: cc Present Absent Comment   Fever/Chills   x    Fatigue  x     Muscle Aches   x    Eye Irritation   x    Sneezing   x    Nasal Seth/Drg  x     Sinus Pressure/Pain   x    Loss of smell   x    Dental pain   x    Sore Throat   x    Swollen Glands   x    Ear Pain/Fullness   x    Cough  x  Productive, yellow/green mucous   Wheeze   x    Chest Pain  x  Tightness   Shortness of breath  x     Rash   x    Other  x  headaches     Symptom duration:  over 3 weeks   Symptom severity:  moderate   Treatments tried:  Nyquil, healthy food, lemon, garlic, ginger, hot tea   Contacts:  mother, adria     Started in chest.  Waxes and wanes a bit.  No blood.  Minor stuffy nose.  Never smoker.  Starting new job at [employer] next week.    Problem list and histories reviewed & adjusted, as indicated.  Additional history: as documented    Reviewed and updated as needed this visit by clinical staff  Tobacco  Allergies  Meds  Problems  Med Hx  Surg Hx  Fam Hx  Soc Hx        Reviewed and updated as needed this visit by Provider  Tobacco  Allergies  Meds  Problems  Med Hx  Surg Hx  Fam Hx  Soc Hx          ROS:  Constitutional, HEENT, cardiovascular, pulmonary, neuro, systems are negative, except as otherwise noted.    OBJECTIVE:     /80 (BP Location: Right arm, Patient Position: Chair, Cuff Size: Adult Large)  Pulse 97  Temp 98.4  F (36.9  C) (Tympanic)  Resp 16  Ht 5' 10.5\" (1.791 m)  Wt 217 lb (98.4 kg)  SpO2 97%  BMI 30.7 " kg/m2  Body mass index is 30.7 kg/(m^2).  GENERAL: healthy, alert and no distress  EYES: Eyes grossly normal to inspection, PERRL and conjunctivae and sclerae normal  HENT: ear canals and TM's normal, nose and mouth without ulcers or lesions  NECK: no adenopathy, no asymmetry, masses, or scars  RESP: lungs clear to auscultation - no rales, rhonchi or wheezes  CV: regular rate and rhythm, normal S1 S2, no S3 or S4, no murmur, click or rub    ASSESSMENT/PLAN:       ICD-10-CM    1. Acute bronchitis with symptoms > 10 days J20.9 azithromycin (ZITHROMAX) 250 MG tablet   2. Migraine with aura and without status migrainosus, not intractable G43.109 SUMAtriptan (IMITREX) 100 MG tablet   3. Eye problem H57.9    4. Need for prophylactic vaccination and inoculation against influenza Z23 FLU VAC, SPLIT VIRUS IM > 3 YO (QUADRIVALENT) [03958]     Vaccine Administration, Initial [85234]     Patient Instructions   Treat for bronchitis    Refilled migraine med  See eye doctor for what sounds like potential floaters       Flu shot     Consider repeat cholesterol test due to family history.        Karen Marquez PA-C  [facility]

## 2018-02-16 ASSESSMENT — PATIENT HEALTH QUESTIONNAIRE - PHQ9: SUM OF ALL RESPONSES TO PHQ QUESTIONS 1-9: 2

## 2018-10-11 ENCOUNTER — OFFICE VISIT (OUTPATIENT)
Dept: FAMILY MEDICINE | Facility: CLINIC | Age: 48
End: 2018-10-11
Payer: COMMERCIAL

## 2018-10-11 VITALS
HEART RATE: 80 BPM | WEIGHT: 212 LBS | DIASTOLIC BLOOD PRESSURE: 84 MMHG | SYSTOLIC BLOOD PRESSURE: 118 MMHG | TEMPERATURE: 97.7 F | RESPIRATION RATE: 16 BRPM | BODY MASS INDEX: 29.99 KG/M2

## 2018-10-11 DIAGNOSIS — G43.109 MIGRAINE WITH AURA AND WITHOUT STATUS MIGRAINOSUS, NOT INTRACTABLE: Primary | ICD-10-CM

## 2018-10-11 DIAGNOSIS — Z23 NEED FOR PROPHYLACTIC VACCINATION AND INOCULATION AGAINST INFLUENZA: ICD-10-CM

## 2018-10-11 PROCEDURE — 99213 OFFICE O/P EST LOW 20 MIN: CPT | Mod: 25 | Performed by: NURSE PRACTITIONER

## 2018-10-11 PROCEDURE — 90471 IMMUNIZATION ADMIN: CPT | Performed by: NURSE PRACTITIONER

## 2018-10-11 PROCEDURE — 90686 IIV4 VACC NO PRSV 0.5 ML IM: CPT | Performed by: NURSE PRACTITIONER

## 2018-10-11 RX ORDER — SUMATRIPTAN 100 MG/1
100 TABLET, FILM COATED ORAL
Qty: 9 TABLET | Refills: 11 | Status: SHIPPED | OUTPATIENT
Start: 2018-10-11 | End: 2020-04-24

## 2018-10-11 ASSESSMENT — PATIENT HEALTH QUESTIONNAIRE - PHQ9
SUM OF ALL RESPONSES TO PHQ QUESTIONS 1-9: 1
SUM OF ALL RESPONSES TO PHQ QUESTIONS 1-9: 1
10. IF YOU CHECKED OFF ANY PROBLEMS, HOW DIFFICULT HAVE THESE PROBLEMS MADE IT FOR YOU TO DO YOUR WORK, TAKE CARE OF THINGS AT HOME, OR GET ALONG WITH OTHER PEOPLE: NOT DIFFICULT AT ALL

## 2018-10-11 ASSESSMENT — ANXIETY QUESTIONNAIRES
6. BECOMING EASILY ANNOYED OR IRRITABLE: NOT AT ALL
4. TROUBLE RELAXING: NOT AT ALL
3. WORRYING TOO MUCH ABOUT DIFFERENT THINGS: NOT AT ALL
2. NOT BEING ABLE TO STOP OR CONTROL WORRYING: NOT AT ALL
7. FEELING AFRAID AS IF SOMETHING AWFUL MIGHT HAPPEN: NOT AT ALL
5. BEING SO RESTLESS THAT IT IS HARD TO SIT STILL: NOT AT ALL
1. FEELING NERVOUS, ANXIOUS, OR ON EDGE: NOT AT ALL
GAD7 TOTAL SCORE: 0
7. FEELING AFRAID AS IF SOMETHING AWFUL MIGHT HAPPEN: NOT AT ALL

## 2018-10-11 NOTE — PATIENT INSTRUCTIONS
* Migraine Headache  Migraine headaches are related to changes in blood flow to the brain. This causes throbbing or constant pain on one or both sides of the head. The pain may last from a few hours to several days. There is usually nausea, vomiting, sensitivity to light and sound, and blurred vision. A migraine attack may be triggered by emotional stress, hormone changes during the menstrual cycle, oral contraceptives, alcohol use, certain foods containing tyramine, eye strain, weather changes, missing meals, or too little or too much sleep.  Home Care For This Headache:  1) If you were given pain medicine for this headache, do not drive yourself home . Arrange for a ride, instead. When you get home, try to sleep. You should feel much better when you wake up.  2) Migraine headaches may improve with an ice pack on the forehead or at the base of the skull. Heat to the back of your neck may relieve any neck spasm.  3) Drink only clear liquids or eat a very light diet to avoid nausea/vomiting until symptoms improve.  Preventing Future Headaches:  1) Pay attention to those factors that seem to trigger your headache. Try to avoid them when you can. If you have frequent headaches, it is useful to keep a diary of what you were doing, feeling or eating in the hours before each attack. Show this to your doctor to help find the cause of your headaches.  a) If you feel that stress is a factor in your headaches, look at the sources of stress in your life. Find ways to release the build-up of those stresses by using regular exercise, relaxation methods (yoga, meditation), bio-feedback or simply taking time-out for yourself. For more information about this, consult your doctor or go to a local bookstore and review books and tapes on this subject.  b) Tyramine is a substance present in the following foods : chocolate, yogurt, all cheeses except cottage cheese and cream cheese. smoked or pickled fish and meat (including herring,  caviar, bologna, pepperoni, salami), liver, avocados, bananas, figs, raisins, and red wine. Be aware that these foods may trigger a migraine in some persons. Try taking these foods out of your diet for 1-2 months to see if this reduces headache frequency.  Treating Future Attacks:  1) At the first sign of a migraine headache, take a medicine to stop it if one has been prescribed for you. If not, take acetaminophen (Tylenol) or ibuprofen (Motrin, Advil) if you are able to take these. The sooner you take medicine, the better it will work.  2) You may also want to find a quiet, dark, comfortable place to sit or lie down. Let yourself relax or sleep.  3) An ice pack on the forehead or area of greatest pain may also help.   Follow Up  with your doctor if the headache is not better within the next 24 hours. If you have frequent headaches you should discuss a treatment plan with your primary care doctor. Ask if you can have medicine to take at home the next time you get a bad headache. Poorly controlled chronic headaches may require a referral to a neurologist (headache specialist).  Get Prompt Medical Attention  if any of the following occur:    Your head pain gets worse, or does not improve within 24 hours    Repeated vomiting (can t keep liquids down)    Sinus or ear or throat pain (not already reported)    Fever of 101  F (38.3  C) or higher, or as directed by your healthcare provider    Stiff neck    Extreme drowsiness, confusion or fainting    Weakness of an arm or leg or one side of the face    Difficulty with speech or vision    6582-0959 [employer]. [address] [place] [address], [place] [address]. All rights reserved. This information is not intended as a substitute for professional medical care. Always follow your healthcare professional's instructions.  This information has been modified by your health care provider with permission from the publisher.        Migraine Headache: Stages and Treatment    A  "migraine headache tends to progress in stages. Learning these stages can help you better understand what is happening. Then you can learn ways to reduce pain and relieve other symptoms. Methods for relieving your symptoms include self-care and medicines.  Migraine stages  Migraines tend to progress through 4 stages. Many people don't have all stages, and stages may differ with each headache:    Prodrome. A few hours to a day or so before the headache, you may feel tired, (yawning many times), uneasy, or moreno. You may also feel bloated or crave certain foods.    Aura. Up to an hour before the headache starts, some migraine sufferers experience aura--flashing lights, blind spots, other vision problems, confusion, difficulty speaking, or other neurologic symptoms.    Headache. Moderate to severe pain affects one side of the head and then can spread to both sides, often along with nausea. You may be highly sensitive to light, sound, and odors. Vomiting or diarrhea may also happen. This stage lasts 4 to 72 hours.    Postdrome. After your headache ends, you may feel tired, achy, and \"washed out.\" This may last for a day or so.  Self-care during a migraine  Here is what you can do:    Use a cold compress. Wrap a thin cloth around a cold pack, a cold can of soda, or a bag of frozen vegetables. Apply this to your temple or other pain site.    Drink fluids. If nausea makes it hard to drink, try sucking on ice.    Rest. If possible, lie down. Try not to bend over, as this may increase your pain. Sometimes laying in a dark quiet room can help the migraine from being aggravated.      Try caffeine. Some people find that drinking fluids with caffeine, such as coffee or tea, helps to lessen migraine pain.  Using medicines  Work with your healthcare provider to find the right medicines for you. Medicines for migraine may relieve pain (analgesics), relieve nausea, or attack the migraine's root causes (migraine-specific " medicines).  Rebound headache  Taking analgesics each day, or even several times a week, may lead to more frequent and severe headaches. These are called rebound headaches. If you think you're having rebound headaches, tell your healthcare provider. He or she can help you safely decrease your medicine. Rebound caffeine withdrawal headaches can also happen.    Date Last Reviewed: 10/9/2015    1688-1573 [employer]. [address] [place] [address], [place] [id number]. All rights reserved. This information is not intended as a substitute for professional medical care. Always follow your healthcare professional's instructions.

## 2018-10-11 NOTE — NURSING NOTE
"Chief Complaint   Patient presents with     Headache     needs refill       Initial /84 (BP Location: Right arm)  Pulse 80  Temp 97.7  F (36.5  C) (Tympanic)  Resp 16  Wt 212 lb (96.2 kg)  BMI 29.99 kg/m2 Estimated body mass index is 29.99 kg/(m^2) as calculated from the following:    Height as of 2/15/18: 5' 10.5\" (1.791 m).    Weight as of this encounter: 212 lb (96.2 kg).    Patient presents to the clinic using No DME    Health Maintenance that is potentially due pending provider review:  NONE    n/a    Is there anyone who you would like to be able to receive your results? No  If yes have patient fill out PRICE    "

## 2018-10-11 NOTE — MR AVS SNAPSHOT
After Visit Summary   10/11/2018    Mazin Herbert    MRN: [id number]           Patient Information     Date Of Birth          1970        Visit Information        Provider Department      10/11/2018 8:40 AM Sosa Galloway APRN [facility]        Today's Diagnoses     Need for prophylactic vaccination and inoculation against influenza    -  1    Migraine with aura and without status migrainosus, not intractable          Care Instructions      * Migraine Headache  Migraine headaches are related to changes in blood flow to the brain. This causes throbbing or constant pain on one or both sides of the head. The pain may last from a few hours to several days. There is usually nausea, vomiting, sensitivity to light and sound, and blurred vision. A migraine attack may be triggered by emotional stress, hormone changes during the menstrual cycle, oral contraceptives, alcohol use, certain foods containing tyramine, eye strain, weather changes, missing meals, or too little or too much sleep.  Home Care For This Headache:  1) If you were given pain medicine for this headache, do not drive yourself home . Arrange for a ride, instead. When you get home, try to sleep. You should feel much better when you wake up.  2) Migraine headaches may improve with an ice pack on the forehead or at the base of the skull. Heat to the back of your neck may relieve any neck spasm.  3) Drink only clear liquids or eat a very light diet to avoid nausea/vomiting until symptoms improve.  Preventing Future Headaches:  1) Pay attention to those factors that seem to trigger your headache. Try to avoid them when you can. If you have frequent headaches, it is useful to keep a diary of what you were doing, feeling or eating in the hours before each attack. Show this to your doctor to help find the cause of your headaches.  a) If you feel that stress is a factor in your headaches, look at the sources of  stress in your life. Find ways to release the build-up of those stresses by using regular exercise, relaxation methods (yoga, meditation), bio-feedback or simply taking time-out for yourself. For more information about this, consult your doctor or go to a local bookstore and review books and tapes on this subject.  b) Tyramine is a substance present in the following foods : chocolate, yogurt, all cheeses except cottage cheese and cream cheese. smoked or pickled fish and meat (including herring, caviar, bologna, pepperoni, salami), liver, avocados, bananas, figs, raisins, and red wine. Be aware that these foods may trigger a migraine in some persons. Try taking these foods out of your diet for 1-2 months to see if this reduces headache frequency.  Treating Future Attacks:  1) At the first sign of a migraine headache, take a medicine to stop it if one has been prescribed for you. If not, take acetaminophen (Tylenol) or ibuprofen (Motrin, Advil) if you are able to take these. The sooner you take medicine, the better it will work.  2) You may also want to find a quiet, dark, comfortable place to sit or lie down. Let yourself relax or sleep.  3) An ice pack on the forehead or area of greatest pain may also help.   Follow Up  with your doctor if the headache is not better within the next 24 hours. If you have frequent headaches you should discuss a treatment plan with your primary care doctor. Ask if you can have medicine to take at home the next time you get a bad headache. Poorly controlled chronic headaches may require a referral to a neurologist (headache specialist).  Get Prompt Medical Attention  if any of the following occur:    Your head pain gets worse, or does not improve within 24 hours    Repeated vomiting (can t keep liquids down)    Sinus or ear or throat pain (not already reported)    Fever of 101  F (38.3  C) or higher, or as directed by your healthcare provider    Stiff neck    Extreme drowsiness,  "confusion or fainting    Weakness of an arm or leg or one side of the face    Difficulty with speech or vision    0938-4566 [employer]. [address] [place] [address], [place] [address]. All rights reserved. This information is not intended as a substitute for professional medical care. Always follow your healthcare professional's instructions.  This information has been modified by your health care provider with permission from the publisher.        Migraine Headache: Stages and Treatment    A migraine headache tends to progress in stages. Learning these stages can help you better understand what is happening. Then you can learn ways to reduce pain and relieve other symptoms. Methods for relieving your symptoms include self-care and medicines.  Migraine stages  Migraines tend to progress through 4 stages. Many people don't have all stages, and stages may differ with each headache:    Prodrome. A few hours to a day or so before the headache, you may feel tired, (yawning many times), uneasy, or moreno. You may also feel bloated or crave certain foods.    Aura. Up to an hour before the headache starts, some migraine sufferers experience aura--flashing lights, blind spots, other vision problems, confusion, difficulty speaking, or other neurologic symptoms.    Headache. Moderate to severe pain affects one side of the head and then can spread to both sides, often along with nausea. You may be highly sensitive to light, sound, and odors. Vomiting or diarrhea may also happen. This stage lasts 4 to 72 hours.    Postdrome. After your headache ends, you may feel tired, achy, and \"washed out.\" This may last for a day or so.  Self-care during a migraine  Here is what you can do:    Use a cold compress. Wrap a thin cloth around a cold pack, a cold can of soda, or a bag of frozen vegetables. Apply this to your temple or other pain site.    Drink fluids. If nausea makes it hard to drink, try sucking on ice.    Rest. If " possible, lie down. Try not to bend over, as this may increase your pain. Sometimes laying in a dark quiet room can help the migraine from being aggravated.      Try caffeine. Some people find that drinking fluids with caffeine, such as coffee or tea, helps to lessen migraine pain.  Using medicines  Work with your healthcare provider to find the right medicines for you. Medicines for migraine may relieve pain (analgesics), relieve nausea, or attack the migraine's root causes (migraine-specific medicines).  Rebound headache  Taking analgesics each day, or even several times a week, may lead to more frequent and severe headaches. These are called rebound headaches. If you think you're having rebound headaches, tell your healthcare provider. He or she can help you safely decrease your medicine. Rebound caffeine withdrawal headaches can also happen.    Date Last Reviewed: 10/9/2015    3091-6208 [employer]. [address] [place] [address]. All rights reserved. This information is not intended as a substitute for professional medical care. Always follow your healthcare professional's instructions.                Follow-ups after your visit        Follow-up notes from your care team     Return in about 6 months (around 4/11/2019) for bhavya .      Who to contact     If you have questions or need follow up information about today's clinic visit or your schedule please contact [facility] directly at [phone].  Normal or non-critical lab and imaging results will be communicated to you by MyChart, letter or phone within 4 business days after the clinic has received the results. If you do not hear from us within 7 days, please contact the clinic through MyChart or phone. If you have a critical or abnormal lab result, we will notify you by phone as soon as possible.  Submit refill requests through "[employer]" or call your pharmacy and they will forward the refill request to us.  Please allow 3 business days for your refill to be completed.          Additional Information About Your Visit        Care EveryWhere ID     This is your Care EveryWhere ID. This could be used by other organizations to access your [facility] medical records  [id number]        Your Vitals Were     Pulse Temperature Respirations BMI (Body Mass Index)          80 97.7  F (36.5  C) (Tympanic) 16 29.99 kg/m2         Blood Pressure from Last 3 Encounters:   10/11/18 118/84   02/15/18 124/80   08/17/17 120/86    Weight from Last 3 Encounters:   10/11/18 212 lb (96.2 kg)   02/15/18 217 lb (98.4 kg)   08/17/17 206 lb (93.4 kg)              We Performed the Following     FLU VACCINE, SPLIT VIRUS, IM (QUADRIVALENT) [94474]- >3 YRS     Vaccine Administration, Initial [25932]          Today's Medication Changes          These changes are accurate as of 10/11/18  8:58 AM.  If you have any questions, ask your nurse or doctor.               Stop taking these medicines if you haven't already. Please contact your care team if you have questions.     azithromycin 250 MG tablet   Commonly known as:  ZITHROMAX   Stopped by:  Sosa Galloway APRN CNP                Where to get your medicines      These medications were sent to [facility] [place] [address]     Phone:  [phone]     SUMAtriptan 100 MG tablet                Primary Care Provider Office Phone # Fax #    Karen Marquez PA-C [phone]       [address] [place], [place] [id number]        Equal Access to Services     [facility]: Hadii aad ku hadasho Sodestinyali, waaxda luqadaha, qaybta kaalmada alberto lock. So [facility] [phone].    ATENCIÓN: Si habla español, tiene a guardado disposición servicios gratuitos de asistencia lingüística. Candis ordonez [phone].    We comply with applicable federal civil rights laws and [place] laws. We do  not discriminate on the basis of race, color, national origin, age, disability, sex, sexual orientation, or gender identity.            Thank you!     Thank you for choosing [facility]  for your care. Our goal is always to provide you with excellent care. Hearing back from our patients is one way we can continue to improve our services. Please take a few minutes to complete the written survey that you may receive in the mail after your visit with us. Thank you!             Your Updated Medication List - Protect others around you: Learn how to safely use, store and throw away your medicines at www.disposemymeds.org.          This list is accurate as of 10/11/18  8:58 AM.  Always use your most recent med list.                   Brand Name Dispense Instructions for use Diagnosis    SUMAtriptan 100 MG tablet    IMITREX    9 tablet    Take 1 tablet (100 mg) by mouth at onset of headache for migraine May repeat in 2 hours if needed: max 2/day.    Migraine with aura and without status migrainosus, not intractable

## 2018-10-11 NOTE — PROGRESS NOTES
SUBJECTIVE:   Mazin Herbert is a 48 year old male who presents to clinic today for the following health issues:      Migraine Follow-Up    Headaches symptoms:  Stable     Frequency: 2 x a month     Duration of headaches: 2 days with out meds    Able to do normal daily activities/work with migraines: No -     Rescue/Relief medication:Imitrex              Effectiveness: total relief    Preventative medication: None    Neurologic complications: No new stroke-like symptoms, loss of vision or speech, numbness or weakness    In the past 4 weeks, how often have you gone to Urgent Care or the emergency room because of your headaches?  0      Amount of exercise or physical activity: cardio, light weight 3 x a week    Problems taking medications regularly: No    Medication side effects: none    Diet: regular (no restrictions)        Problem list and histories reviewed & adjusted, as indicated.  Additional history: as documented    Patient Active Problem List   Diagnosis     Mild major depression (H)     Migraine with aura and without status migrainosus, not intractable     TMJ (temporomandibular joint syndrome)     Generalized anxiety disorder     Past Surgical History:   Procedure Laterality Date     TONSILLECTOMY         Social History   Substance Use Topics     Smoking status: Never Smoker     Smokeless tobacco: Never Used     Alcohol use No      Comment: none     Family History   Problem Relation Age of Onset     Alcohol/Drug Mother      alcohol     C.A.D. Father      first MI age 37 yrs old-3 hearts attacks after that, open heart surgery     Diabetes Father      Hypertension Father      Alcohol/Drug Father      alcohol     Depression Father      GASTROINTESTINAL DISEASE Father      gallbladder removed     Lipids Father      Gynecology Sister      fibroids         Current Outpatient Prescriptions   Medication Sig Dispense Refill     SUMAtriptan (IMITREX) 100 MG tablet Take 1 tablet (100 mg) by mouth at onset  of headache for migraine May repeat in 2 hours if needed: max 2/day. 9 tablet 3     No Known Allergies    Reviewed and updated as needed this visit by clinical staff  Tobacco  Allergies  Meds  Med Hx  Surg Hx  Fam Hx  Soc Hx      Reviewed and updated as needed this visit by Provider         ROS:  Constitutional, HEENT, cardiovascular, pulmonary, gi and gu systems are negative, except as otherwise noted.    OBJECTIVE:     /84 (BP Location: Right arm)  Pulse 80  Temp 97.7  F (36.5  C) (Tympanic)  Resp 16  Wt 212 lb (96.2 kg)  BMI 29.99 kg/m2  Body mass index is 29.99 kg/(m^2).  GENERAL: healthy, alert and no distress  EYES: Eyes grossly normal to inspection, PERRL and conjunctivae and sclerae normal  HENT: ear canals and TM's normal, nose and mouth without ulcers or lesions  NECK: no adenopathy, no asymmetry, masses, or scars and thyroid normal to palpation  RESP: lungs clear to auscultation - no rales, rhonchi or wheezes  CV: regular rate and rhythm, normal S1 S2, no S3 or S4, no murmur, click or rub, no peripheral edema and peripheral pulses strong  ABDOMEN: soft, nontender, no hepatosplenomegaly, no masses and bowel sounds normal  MS: no gross musculoskeletal defects noted, no edema  SKIN: no suspicious lesions or rashes  NEURO: Normal strength and tone, mentation intact and speech normal  PSYCH: mentation appears normal, affect normal/bright        ASSESSMENT/PLAN:   (G43.109) Migraine with aura and without status migrainosus, not intractable  (primary encounter diagnosis)  Comment: Controlled with Imitrex patient is down to 2 migraines a month did review with him the use of preventative medications patient states he would like to stay with the Imitrex.  At this point did renew for 3 refills based on the time that the patient uses this prescription would be reasonable to refill one time for him without having to be seen but should be seen every 6 months for his migraines.  Plan: SUMAtriptan  (IMITREX) 100 MG tablet  Given today    (Z23) Need for prophylactic vaccination and inoculation against influenza  Comment: Given today  Plan: FLU VACCINE, SPLIT VIRUS, IM (QUADRIVALENT)         [20529]- >3 YRS, Vaccine Administration,         Initial [03726]                [facility]  Answers for HPI/ROS submitted by the patient on 10/11/2018   If you checked off any problems, how difficult have these problems made it for you to do your work, take care of things at home, or get along with other people?: Not difficult at all  PHQ9 TOTAL SCORE: 1  VENECIA 7 TOTAL SCORE: 0      Injectable Influenza Immunization Documentation    1.  Is the person to be vaccinated sick today?   No    2. Does the person to be vaccinated have an allergy to a component   of the vaccine?   No  Egg Allergy Algorithm Link    3. Has the person to be vaccinated ever had a serious reaction   to influenza vaccine in the past?   No    4. Has the person to be vaccinated ever had Guillain-Barré syndrome?   No    Form completed by estefani

## 2018-10-12 ASSESSMENT — PATIENT HEALTH QUESTIONNAIRE - PHQ9: SUM OF ALL RESPONSES TO PHQ QUESTIONS 1-9: 1

## 2018-10-12 ASSESSMENT — ANXIETY QUESTIONNAIRES: GAD7 TOTAL SCORE: 0

## 2020-04-21 DIAGNOSIS — G43.109 MIGRAINE WITH AURA AND WITHOUT STATUS MIGRAINOSUS, NOT INTRACTABLE: ICD-10-CM

## 2020-04-21 RX ORDER — SUMATRIPTAN 100 MG/1
100 TABLET, FILM COATED ORAL
Qty: 9 TABLET | Refills: 11 | Status: CANCELLED | OUTPATIENT
Start: 2020-04-21

## 2020-04-24 ENCOUNTER — VIRTUAL VISIT (OUTPATIENT)
Dept: FAMILY MEDICINE | Facility: CLINIC | Age: 50
End: 2020-04-24

## 2020-04-24 DIAGNOSIS — G43.109 MIGRAINE WITH AURA AND WITHOUT STATUS MIGRAINOSUS, NOT INTRACTABLE: ICD-10-CM

## 2020-04-24 PROCEDURE — 99213 OFFICE O/P EST LOW 20 MIN: CPT | Mod: 95 | Performed by: NURSE PRACTITIONER

## 2020-04-24 RX ORDER — SUMATRIPTAN 100 MG/1
100 TABLET, FILM COATED ORAL
Qty: 9 TABLET | Refills: 11 | Status: SHIPPED | OUTPATIENT
Start: 2020-04-24 | End: 2021-11-18

## 2020-04-24 NOTE — NURSING NOTE
"Chief Complaint   Patient presents with     Refill Request       Initial There were no vitals taken for this visit. Estimated body mass index is 29.99 kg/m  as calculated from the following:    Height as of 2/15/18: 1.791 m (5' 10.5\").    Weight as of 10/11/18: 96.2 kg (212 lb).    Patient presents to the clinic using No DME    Health Maintenance that is potentially due pending provider review:  NONE        Is there anyone who you would like to be able to receive your results? No  If yes have patient fill out PRICE      "

## 2020-04-24 NOTE — PATIENT INSTRUCTIONS
Patient Education     Migraine Headache  This often severe type of headache is different from other types of headaches in that symptoms other than pain occur with the headache. Nausea and vomiting, lightheadedness, sensitivity to light (photophobia), and other visual disturbances are common migraine symptoms. The pain may last from a few hours to several days. It is not clear why migraines occur but certain factors called  triggers  can raise the risk of having a migraine attack. A migraine may be triggered by emotional stress or depression, or by hormone changes during the menstrual cycle. Other triggers include birth control pills, overuse of migraine medicines, alcohol or caffeine, foods with tyramine (such as aged cheese and wine), eyestrain, weather changes, missed meals, or too little or too much sleep.  Home care  Follow these tips when taking care of yourself at home:    Don t drive yourself home if you were given pain medicine for your headache or are having visual symptoms. Instead, have someone else drive you home. Try to sleep when you get home. You should feel much better when you wake up.    Cold can help ease migraine symptoms. Put an ice pack on your forehead or at the base of your skull. Put heat on the back of your neck to help ease any neck spasm.    Drink only clear liquids or eat a light diet until your symptoms get better. This will help you avoid nausea and vomiting.  How to prevent migraines  Pay attention to what seems to trigger your headache. Try to avoid the triggers when you can. If you have frequent headaches, consider keeping a headache diary. In it, write down what you were doing, feeling, or eating in the hours before each headache. Show this to your healthcare provider to help find the cause of your headaches.  If stress seems to be a trigger for your headaches, figure out what is causing stress in your life. Learn new ways to handle your stress. Ideas include regular exercise,  biofeedback, self-hypnosis, yoga, and meditation. Talk with your healthcare provider to find out more information about managing stress. Many books and digital media are also available on this subject.  Tyramine is a substance found in many foods. It can trigger a migraine in some people. These foods contain tyramine:    Chocolate    Yogurt    All cheeses, but especially aged cheeses    Smoked or pickled fish and meat, including herring, caviar, bologna, pepperoni, and salami    Liver    Avocados    Bananas    Figs    Raisins    Red wine  Try staying away from these foods for 1 to 2 months to see if you have fewer headaches.  How to treat future headaches    Take time out at the first sign of a headache, if possible. Find a quiet, dark, comfortable place to sit or lie down. Let yourself relax or sleep.    Put an ice pack on your forehead or on the area of greatest pain. A heating pad and massage may help if you are having a muscle spasm and tightness in your neck.    If you have been prescribed a medicine to stop a migraine headache, use this at the first warning sign of the headache for best results. First signs may be an aura or pain.    If you need to take medicine often for your migraine, talk with your healthcare provider about other ways to prevent your headaches.  Follow-up care  Follow up with your healthcare provider, or as advised. Talk with your provider if you have frequent headaches. He or she can figure out a treatment plan. Ask if you can have medicine to take at home the next time you get a bad headache. This may keep you from having to visit the emergency department in the future. You may need to see a headache specialist (neurologist) if you continue to have headaches.  When to seek medical advice  Call your healthcare provider right away if any of these occur:    Your head pain gets worse, or doesn t get better within 24 hours    You can t keep liquids down (repeated vomiting)    Pain in your  sinuses, ears, or throat    Fever of 100.4  F (38  C) or higher, or as directed by your healthcare provider    Stiff neck    Extreme drowsiness, confusion, or fainting    Dizziness, or dizziness with spinning sensation (vertigo)    Weakness in an arm or leg, or on one side of your face    Difficulty talking or seeing  Date Last Reviewed: 8/1/2016 2000-2019 [employer]. [address] [place] [address]. All rights reserved. This information is not intended as a substitute for professional medical care. Always follow your healthcare professional's instructions.

## 2020-04-24 NOTE — PROGRESS NOTES
"Mazin Herbert is a 49 year old male who is being evaluated via a billable telephone visit.      The patient has been notified of following:     \"This telephone visit will be conducted via a call between you and your physician/provider. We have found that certain health care needs can be provided without the need for a physical exam.  This service lets us provide the care you need with a short phone conversation.  If a prescription is necessary we can send it directly to your pharmacy.  If lab work is needed we can place an order for that and you can then stop by our lab to have the test done at a later time.    Telephone visits are billed at different rates depending on your insurance coverage. During this emergency period, for some insurers they may be billed the same as an in-person visit.  Please reach out to your insurance provider with any questions.    If during the course of the call the physician/provider feels a telephone visit is not appropriate, you will not be charged for this service.\"    Patient has given verbal consent for Telephone visit?  Yes    How would you like to obtain your AVS? Mail a copy    Subjective     Mazin Herbert is a 49 year old male who presents to clinic today for the following health issues:    HPI  Medication Followup of Sumatriptan    Taking Medication as prescribed: yes    Side Effects:  None    Medication Helping Symptoms:  yes            Patient Active Problem List   Diagnosis     Mild major depression (H)     Migraine with aura and without status migrainosus, not intractable     TMJ (temporomandibular joint syndrome)     Generalized anxiety disorder     Past Surgical History:   Procedure Laterality Date     TONSILLECTOMY         Social History     Tobacco Use     Smoking status: Never Smoker     Smokeless tobacco: Never Used   Substance Use Topics     Alcohol use: No     Comment: none     Family History   Problem Relation Age of Onset     Alcohol/Drug " Mother         alcohol     C.A.D. Father         first MI age 37 yrs old-3 hearts attacks after that, open heart surgery     Diabetes Father      Hypertension Father      Alcohol/Drug Father         alcohol     Depression Father      Gastrointestinal Disease Father         gallbladder removed     Lipids Father      Gynecology Sister         fibroids         Current Outpatient Medications   Medication Sig Dispense Refill     SUMAtriptan (IMITREX) 100 MG tablet Take 1 tablet (100 mg) by mouth at onset of headache for migraine May repeat in 2 hours if needed: max 2/day. 9 tablet 11     No Known Allergies  Recent Labs   Lab Test 08/04/14  1059 03/29/13  0510     --    HDL 43  --    TRIG 185*  --    CR  --  0.96   GFRESTIMATED  --  87   GFRESTBLACK  --  >90   POTASSIUM  --  4.2      BP Readings from Last 3 Encounters:   10/11/18 118/84   02/15/18 124/80   08/17/17 120/86    Wt Readings from Last 3 Encounters:   10/11/18 96.2 kg (212 lb)   02/15/18 98.4 kg (217 lb)   08/17/17 93.4 kg (206 lb)                    Reviewed and updated as needed this visit by Provider         Review of Systems   ROS COMP: Constitutional, HEENT, cardiovascular, pulmonary, gi and gu systems are negative, except as otherwise noted.       Objective   Reported vitals:  There were no vitals taken for this visit.   healthy, alert and no distress  PSYCH: Alert and oriented times 3; coherent speech, normal   rate and volume, able to articulate logical thoughts, able   to abstract reason, no tangential thoughts, no hallucinations   or delusions  His affect is normal  RESP: No cough, no audible wheezing, able to talk in full sentences  Remainder of exam unable to be completed due to telephone visits            Assessment/Plan:  1. Migraine with aura and without status migrainosus, not intractable  Controlled renewed today   - SUMAtriptan (IMITREX) 100 MG tablet; Take 1 tablet (100 mg) by mouth at onset of headache for migraine May repeat in 2  hours if needed: max 2/day.  Dispense: 9 tablet; Refill: 11    No follow-ups on file.      Phone call duration:  6 minutes    SWATHI Daniel CNP

## 2021-01-10 ENCOUNTER — OFFICE VISIT (OUTPATIENT)
Dept: URGENT CARE | Facility: URGENT CARE | Age: 51
End: 2021-01-10

## 2021-01-10 VITALS
OXYGEN SATURATION: 99 % | TEMPERATURE: 97.4 F | RESPIRATION RATE: 20 BRPM | SYSTOLIC BLOOD PRESSURE: 179 MMHG | HEART RATE: 103 BPM | DIASTOLIC BLOOD PRESSURE: 90 MMHG

## 2021-01-10 DIAGNOSIS — R07.9 CHEST PAIN, UNSPECIFIED TYPE: Primary | ICD-10-CM

## 2021-01-10 PROCEDURE — 99215 OFFICE O/P EST HI 40 MIN: CPT | Performed by: EMERGENCY MEDICINE

## 2021-01-10 PROCEDURE — 93000 ELECTROCARDIOGRAM COMPLETE: CPT | Performed by: EMERGENCY MEDICINE

## 2021-01-10 RX ORDER — ASPIRIN 81 MG/1
324 TABLET, CHEWABLE ORAL ONCE
Status: COMPLETED | OUTPATIENT
Start: 2021-01-10 | End: 2021-01-10

## 2021-01-10 RX ADMIN — ASPIRIN 324 MG: 81 TABLET, CHEWABLE ORAL at 15:12

## 2021-01-10 ASSESSMENT — PAIN SCALES - GENERAL: PAINLEVEL: WORST PAIN (10)

## 2021-01-10 NOTE — PATIENT INSTRUCTIONS
Please go to the emergency department for your chest pain    If you elect to stay home, take antacids every 1-2 hours for your pain.

## 2021-01-10 NOTE — NURSING NOTE
Clinic Administered Medication Documentation    Oral Medication Documentation    Patient was given Aspirin 81 mg x4. Prior to medication administration, verified patients identity using patient s name and date of birth. Please see MAR and medication order for additional information.     Was entire amount of medication used? Yes  Expiration Date: 07/2023  Clinic Administered Medication Documentation    Oral Medication Documentation    Patient was given Xylocaine & Maalox. Prior to medication administration, verified patients identity using patient s name and date of birth. Please see MAR and medication order for additional information.     Was entire amount of medication used? Yes  Expiration Date: 1/2021 & 3/2021  Lizbeth Moreno CMA

## 2021-01-10 NOTE — LETTER
1/10/2021        RE: Mazin Herbert  [id number]  [facility] [address]        HPI: Patient is a 50-year-old male who noted the onset of burning chest pain at 4 AM.  He thought it was related to eating barbecue.  He tried antacids and Advil with no improvement.  Indicates the pain to be diffuse from his upper abdomen to his mid chest burning in nature.  He describes it as severe.  He also makes him short of breath but wonders whether that is due to a panic attack.  He has no history of heart disease but father had an MI in his late 30s.  There is a question of gallbladder disease but patient does not have nominal pain at this time.  He is non-smoker.  No history of hypertension.      ROS: See HPI otherwise normal.    No Known Allergies   Current Outpatient Medications   Medication Sig Dispense Refill     SUMAtriptan (IMITREX) 100 MG tablet Take 1 tablet (100 mg) by mouth at onset of headache for migraine May repeat in 2 hours if needed: max 2/day. (Patient not taking: Reported on 1/10/2021) 9 tablet 11         PE: Physical exam reveals a 50-year-old male who presents in acute distress he appears dyspneic and anxious.  HEENT reveals moist mucous membranes.  Chest is nontender.  Breath sounds are symmetrically intact clear throughout heart regular rate and rhythm with no prominent murmur.  Abdomen is soft and nontender including palpation of the epigastrium and right upper quadrant.  Skin warm and moist.        TREATMENT: EKG: Marked baseline disturbance.  No acute ST-T wave changes.  4 baby aspirin administered due to high likelihood of this being cardiac in etiology.  Patient is refusing 911 transfer to the hospital.  Maalox 60 cc plus viscous Xylocaine 10 cc given to assist in diagnostic evaluation due to fact the patient would not agree to transfer.  He has had no improvement with a GI cocktail.    3:20 PM: I have talked with patient for 5 times about my major concern that he is having a  cardiac issue due to severe chest pain.  He has had no improvement with the Maalox and viscous Xylocaine.  His pain is very suspicious for being cardiac in nature.  Even with multiple conversations at this point he is refusing to go to the emergency department out of concern for financial reasons.  I have told him would be a very serious and poor decision for him to leave this urgent care and not be seen.    3:25 PM.  Patient decided to sign out AGAINST MEDICAL ADVICE.  He knows that I am concerned he is having a heart attack.  He states he will try antiacid and go to the emergency department if his pain does not get better.  I am extremely worried about his wellbeing.  His ex-wife is here and she is aware that situation as well.    ASSESSMENT: Chest pain, possible acute coronary syndrome.  Needs emergency evaluation in the emergency department.  Patient is refusing and signed out AMA.      DIAGNOSIS: Chest pain      PLAN: Please go to the emergency department for your chest pain.  If you go home try liquid antiacid every 1 hour for the pain.          Sincerely,        Flaco Lagos MD

## 2021-01-10 NOTE — PROGRESS NOTES
HPI: Patient is a 50-year-old male who noted the onset of burning chest pain at 4 AM.  He thought it was related to eating barbecue.  He tried antacids and Advil with no improvement.  Indicates the pain to be diffuse from his upper abdomen to his mid chest burning in nature.  He describes it as severe.  He also makes him short of breath but wonders whether that is due to a panic attack.  He has no history of heart disease but father had an MI in his late 30s.  There is a question of gallbladder disease but patient does not have nominal pain at this time.  He is non-smoker.  No history of hypertension.      ROS: See HPI otherwise normal.    No Known Allergies   Current Outpatient Medications   Medication Sig Dispense Refill     SUMAtriptan (IMITREX) 100 MG tablet Take 1 tablet (100 mg) by mouth at onset of headache for migraine May repeat in 2 hours if needed: max 2/day. (Patient not taking: Reported on 1/10/2021) 9 tablet 11         PE: Physical exam reveals a 50-year-old male who presents in acute distress he appears dyspneic and anxious.  HEENT reveals moist mucous membranes.  Chest is nontender.  Breath sounds are symmetrically intact clear throughout heart regular rate and rhythm with no prominent murmur.  Abdomen is soft and nontender including palpation of the epigastrium and right upper quadrant.  Skin warm and moist.        TREATMENT: EKG: Marked baseline disturbance.  No acute ST-T wave changes.  4 baby aspirin administered due to high likelihood of this being cardiac in etiology.  Patient is refusing 911 transfer to the hospital.  Maalox 60 cc plus viscous Xylocaine 10 cc given to assist in diagnostic evaluation due to fact the patient would not agree to transfer.  He has had no improvement with a GI cocktail.    3:20 PM: I have talked with patient for 5 times about my major concern that he is having a cardiac issue due to severe chest pain.  He has had no improvement with the Maalox and viscous Xylocaine.   His pain is very suspicious for being cardiac in nature.  Even with multiple conversations at this point he is refusing to go to the emergency department out of concern for financial reasons.  I have told him would be a very serious and poor decision for him to leave this urgent care and not be seen.    3:25 PM.  Patient decided to sign out AGAINST MEDICAL ADVICE.  He knows that I am concerned he is having a heart attack.  He states he will try antiacid and go to the emergency department if his pain does not get better.  I am extremely worried about his wellbeing.  His ex-wife is here and she is aware that situation as well.    ASSESSMENT: Chest pain, possible acute coronary syndrome.  Needs emergency evaluation in the emergency department.  Patient is refusing and signed out AMA.      DIAGNOSIS: Chest pain      PLAN: Please go to the emergency department for your chest pain.  If you go home try liquid antiacid every 1 hour for the pain.

## 2021-06-08 ENCOUNTER — IMMUNIZATION (OUTPATIENT)
Dept: LAB | Facility: CLINIC | Age: 51
End: 2021-06-08

## 2021-11-17 ENCOUNTER — TELEPHONE (OUTPATIENT)
Dept: FAMILY MEDICINE | Facility: CLINIC | Age: 51
End: 2021-11-17

## 2021-11-17 NOTE — TELEPHONE ENCOUNTER
Patient's call transferred to author  Patient attempting to reach the [facility]    Patient states he had a hang nail to his left ring finger - near the nail bed  States he removed the hang nail himself a week ago  Skin near nail bed is reddened, swollen, and warm to the touch    Denies fevers  Denies body aches   Denies chills  Denies numbness / tingling to finger    Rates pain a 5/10 on the pain scale  Describes the pain as pressure   Pain is intermittent  Denies pain radiating  States pain worsens when touching the area    Patient also would like a refill of his Imitrex at this appointment as well    Appointment scheduled with Kemar for 11/18/2021 at 1340    Patient verbalized understanding  No further questions/concerns    Ovidio Lucas RN

## 2021-11-18 ENCOUNTER — OFFICE VISIT (OUTPATIENT)
Dept: FAMILY MEDICINE | Facility: CLINIC | Age: 51
End: 2021-11-18

## 2021-11-18 VITALS
HEIGHT: 70 IN | WEIGHT: 228 LBS | TEMPERATURE: 97.3 F | HEART RATE: 94 BPM | RESPIRATION RATE: 16 BRPM | BODY MASS INDEX: 32.64 KG/M2 | OXYGEN SATURATION: 97 % | DIASTOLIC BLOOD PRESSURE: 78 MMHG | SYSTOLIC BLOOD PRESSURE: 124 MMHG

## 2021-11-18 DIAGNOSIS — L08.9 FINGER INFECTION: Primary | ICD-10-CM

## 2021-11-18 DIAGNOSIS — G43.109 MIGRAINE WITH AURA AND WITHOUT STATUS MIGRAINOSUS, NOT INTRACTABLE: ICD-10-CM

## 2021-11-18 DIAGNOSIS — H61.21 IMPACTED CERUMEN OF RIGHT EAR: ICD-10-CM

## 2021-11-18 DIAGNOSIS — Z23 HIGH PRIORITY FOR 2019-NCOV VACCINE: ICD-10-CM

## 2021-11-18 DIAGNOSIS — F33.9 RECURRENT MAJOR DEPRESSIVE DISORDER, REMISSION STATUS UNSPECIFIED (H): ICD-10-CM

## 2021-11-18 PROCEDURE — 0004A COVID-19,PF,PFIZER (12+ YRS): CPT | Performed by: PHYSICIAN ASSISTANT

## 2021-11-18 PROCEDURE — 99213 OFFICE O/P EST LOW 20 MIN: CPT | Mod: 25 | Performed by: PHYSICIAN ASSISTANT

## 2021-11-18 PROCEDURE — 91300 COVID-19,PF,PFIZER (12+ YRS): CPT | Performed by: PHYSICIAN ASSISTANT

## 2021-11-18 PROCEDURE — 69209 REMOVE IMPACTED EAR WAX UNI: CPT | Mod: RT | Performed by: PHYSICIAN ASSISTANT

## 2021-11-18 RX ORDER — CEPHALEXIN 500 MG/1
500 CAPSULE ORAL 2 TIMES DAILY
Qty: 10 CAPSULE | Refills: 0 | Status: SHIPPED | OUTPATIENT
Start: 2021-11-18 | End: 2021-11-26

## 2021-11-18 RX ORDER — SUMATRIPTAN 100 MG/1
100 TABLET, FILM COATED ORAL
Qty: 9 TABLET | Refills: 3 | Status: SHIPPED | OUTPATIENT
Start: 2021-11-18 | End: 2022-04-07

## 2021-11-18 ASSESSMENT — MIFFLIN-ST. JEOR: SCORE: 1909.42

## 2021-11-18 ASSESSMENT — PAIN SCALES - GENERAL: PAINLEVEL: MILD PAIN (2)

## 2021-11-18 NOTE — PROGRESS NOTES
"  Assessment & Plan     Finger infection  treat  - cephALEXin (KEFLEX) 500 MG capsule; Take 1 capsule (500 mg) by mouth 2 times daily    Migraine with aura and without status migrainosus, not intractable  stable  - SUMAtriptan (IMITREX) 100 MG tablet; Take 1 tablet (100 mg) by mouth at onset of headache for migraine May repeat in 2 hours if needed: max 2/day.    Recurrent major depressive disorder, remission status unspecified (H)  Does not sound like doing very well but he is unwilling to treat at this time    Impacted cerumen of right ear  Manually removed via curette by provider  - REMOVE IMPACTED CERUMEN    High priority for 2019-nCoV vaccine  - COVID-19,PF,PFIZER (12+ Yrs PURPLE LABEL)    Patient Instructions   Decided to try antibiotic for finger infection  Can also soak warm water 10-15 minutes 2-3 times a day  Streaking or spreading redness needs to be seen  If worsening, infection needs to be opened    Refilled imitrex     Recommend covid booster      No follow-ups on file.    JEREMIAH Colon [facility] [place] [facility] is a 51 year old who presents for the following health issues     HPI     Skin Lesion  Onset/Duration: 7 days  Description  Location: left ring finger  Color: pink  Border description: evenly pigmented, raised, red, inflamed  Character: round, raised, painful, red  Itching: no  Bleeding:  no  Intensity:  moderate  Progression of Symptoms:  worsening  Accompanying signs and symptoms:   Bleeding: no  Scaling: no  Excessive sun exposure/tanning: no  Sunscreen used: YES  History:           Any previous history of skin cancer: no  Any family history of melanoma: no  Previous episodes of similar lesion: no  Precipitating or alleviating factors: bumping it or touching it makes it worse  Therapies tried and outcome: none    No insurance.    Mood - 7/10.  \"Better if Iam was president.\"  Exercise helps.  Able to enjoy things.  Sleep is great.  " "    Migraine     Since your last clinic visit, how have your headaches changed?  Improved    How often are you getting headaches or migraines? About once a month some months then sometimes 8 in one month, less frequent     Are you able to do normal daily activities when you have a migraine? No    Are you taking rescue/relief medications? (Select all that apply) sumatriptan (Imitrex)    How helpful is your rescue/relief medication?  I get total relief    Are you taking any medications to prevent migraines? (Select all that apply)  No    In the past 4 weeks, how often have you gone to urgent care or the emergency room because of your headaches?  0      How many servings of fruits and vegetables do you eat daily?  2-3    On average, how many sweetened beverages do you drink each day (Examples: soda, juice, sweet tea, etc.  Do NOT count diet or artificially sweetened beverages)?   0    How many days per week do you exercise enough to make your heart beat faster? 3 or less    How many minutes a day do you exercise enough to make your heart beat faster? 60 or more    How many days per week do you miss taking your medication? 0     Overall feels migraines doing great.  Can go a month without a migraine, but is dependent upon self care.  Eating bad, lacking exercise, not brushing teeth seem to cause lots of migraines.        Objective    /78 (BP Location: Right arm, Patient Position: Sitting, Cuff Size: Adult Regular)   Pulse 94   Temp 97.3  F (36.3  C) (Tympanic)   Resp 16   Ht 1.784 m (5' 10.25\")   Wt 103.4 kg (228 lb)   SpO2 97%   BMI 32.48 kg/m    Body mass index is 32.48 kg/m .  Physical Exam   R ear extensive cerumen impaction  L 4th finger dorsal DIP with swelling redness, early fluctuance, skin slough      "

## 2021-11-18 NOTE — PATIENT INSTRUCTIONS
Decided to try antibiotic for finger infection  Can also soak warm water 10-15 minutes 2-3 times a day  Streaking or spreading redness needs to be seen  If worsening, infection needs to be opened    Refilled imitrex     Recommend covid booster

## 2021-11-23 ENCOUNTER — TELEPHONE (OUTPATIENT)
Dept: FAMILY MEDICINE | Facility: CLINIC | Age: 51
End: 2021-11-23

## 2021-11-23 DIAGNOSIS — L08.9 FINGER INFECTION: ICD-10-CM

## 2021-11-24 NOTE — TELEPHONE ENCOUNTER
Call and get update.  If nearly resolved, ok to give 2 more days of meds.  If not improving needs to be seen for likely I&D.

## 2021-11-26 RX ORDER — CEPHALEXIN 500 MG/1
CAPSULE ORAL
Qty: 4 CAPSULE | Refills: 0 | Status: SHIPPED | OUTPATIENT
Start: 2021-11-26 | End: 2023-05-22

## 2021-11-26 NOTE — TELEPHONE ENCOUNTER
Spoke with patient, he states the redness and swelling are almost resolved. No fever. He agrees to F/U if not better after 2 more days of antibiotic.  Esperanza CASEY RN

## 2022-04-07 ENCOUNTER — OFFICE VISIT (OUTPATIENT)
Dept: FAMILY MEDICINE | Facility: CLINIC | Age: 52
End: 2022-04-07
Payer: COMMERCIAL

## 2022-04-07 VITALS
BODY MASS INDEX: 31.75 KG/M2 | WEIGHT: 221.8 LBS | OXYGEN SATURATION: 96 % | TEMPERATURE: 97.8 F | HEART RATE: 99 BPM | RESPIRATION RATE: 18 BRPM | SYSTOLIC BLOOD PRESSURE: 138 MMHG | DIASTOLIC BLOOD PRESSURE: 84 MMHG | HEIGHT: 70 IN

## 2022-04-07 DIAGNOSIS — G43.109 MIGRAINE WITH AURA AND WITHOUT STATUS MIGRAINOSUS, NOT INTRACTABLE: Primary | ICD-10-CM

## 2022-04-07 DIAGNOSIS — E66.811 CLASS 1 OBESITY WITHOUT SERIOUS COMORBIDITY IN ADULT, UNSPECIFIED BMI, UNSPECIFIED OBESITY TYPE: ICD-10-CM

## 2022-04-07 DIAGNOSIS — F33.1 MODERATE EPISODE OF RECURRENT MAJOR DEPRESSIVE DISORDER (H): ICD-10-CM

## 2022-04-07 PROCEDURE — 99214 OFFICE O/P EST MOD 30 MIN: CPT | Performed by: PHYSICIAN ASSISTANT

## 2022-04-07 RX ORDER — SUMATRIPTAN 100 MG/1
100 TABLET, FILM COATED ORAL
Qty: 12 TABLET | Refills: 11 | Status: SHIPPED | OUTPATIENT
Start: 2022-04-07 | End: 2023-05-22

## 2022-04-07 RX ORDER — DULOXETIN HYDROCHLORIDE 30 MG/1
30 CAPSULE, DELAYED RELEASE ORAL DAILY
Qty: 30 CAPSULE | Refills: 0 | Status: SHIPPED | OUTPATIENT
Start: 2022-04-07 | End: 2022-05-04

## 2022-04-07 ASSESSMENT — ANXIETY QUESTIONNAIRES
3. WORRYING TOO MUCH ABOUT DIFFERENT THINGS: MORE THAN HALF THE DAYS
1. FEELING NERVOUS, ANXIOUS, OR ON EDGE: SEVERAL DAYS
GAD7 TOTAL SCORE: 10
5. BEING SO RESTLESS THAT IT IS HARD TO SIT STILL: NOT AT ALL
7. FEELING AFRAID AS IF SOMETHING AWFUL MIGHT HAPPEN: SEVERAL DAYS
GAD7 TOTAL SCORE: 10
2. NOT BEING ABLE TO STOP OR CONTROL WORRYING: MORE THAN HALF THE DAYS
4. TROUBLE RELAXING: SEVERAL DAYS
6. BECOMING EASILY ANNOYED OR IRRITABLE: NEARLY EVERY DAY
GAD7 TOTAL SCORE: 10
7. FEELING AFRAID AS IF SOMETHING AWFUL MIGHT HAPPEN: SEVERAL DAYS

## 2022-04-07 ASSESSMENT — PATIENT HEALTH QUESTIONNAIRE - PHQ9
SUM OF ALL RESPONSES TO PHQ QUESTIONS 1-9: 15
10. IF YOU CHECKED OFF ANY PROBLEMS, HOW DIFFICULT HAVE THESE PROBLEMS MADE IT FOR YOU TO DO YOUR WORK, TAKE CARE OF THINGS AT HOME, OR GET ALONG WITH OTHER PEOPLE: SOMEWHAT DIFFICULT
SUM OF ALL RESPONSES TO PHQ QUESTIONS 1-9: 15

## 2022-04-07 ASSESSMENT — PAIN SCALES - GENERAL: PAINLEVEL: NO PAIN (0)

## 2022-04-07 NOTE — NURSING NOTE
"Chief Complaint   Patient presents with     Headache       Initial There were no vitals taken for this visit. Estimated body mass index is 32.48 kg/m  as calculated from the following:    Height as of 11/18/21: 1.784 m (5' 10.25\").    Weight as of 11/18/21: 103.4 kg (228 lb).    Patient presents to the clinic using No DME    Health Maintenance that is potentially due pending provider review:  NONE    n/a    Is there anyone who you would like to be able to receive your results? No  If yes have patient fill out PRICE    "

## 2022-04-07 NOTE — LETTER
[facility]  [address] [place] [id number]  Phone: [phone]  Fax: [phone]    04/07/22    Mazin BROOKS [place]  [address] [facility] [address]      To whom it may concern:     Mazin was seen in clinic today.  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  This is due to a medical condition.    Sincerely,      Karen Marquez PA-C

## 2022-04-07 NOTE — PROGRESS NOTES
Assessment & Plan     Migraine with aura and without status migrainosus, not intractable  Add duloxetine  - SUMAtriptan (IMITREX) 100 MG tablet; Take 1 tablet (100 mg) by mouth at onset of headache for migraine May repeat in 2 hours if needed: max 2/day.  - DULoxetine (CYMBALTA) 30 MG capsule; Take 1 capsule (30 mg) by mouth daily    Moderate episode of recurrent major depressive disorder (H)  Begin treating.  He declines counseling at this time but we discussed that he should eventually consider counseling.  He feels his depression started at age 15.  - DULoxetine (CYMBALTA) 30 MG capsule; Take 1 capsule (30 mg) by mouth daily    Class 1 obesity without serious comorbidity in adult, unspecified BMI, unspecified obesity type  -monitor.  He deferred labs today.    Patient Instructions   Increased quantity of imitrex for migraines  Start duloxetine (cymbalta) for depression, migraines, pain   Can take 2-3 weeks to see benefit  Recheck 3-4 weeks - labs likely then      No follow-ups on file.    JEREMIAH Colon [facility]    Harsh Retana is a 51 year old who presents for the following health issues    Migraines 10 x a month.  Triggers- stress, eating poorly, not exercising, alcohol.  Sleeping ok.      HPI     Abnormal Mood Symptoms  Onset/Duration: 3 months  Description: home life contributing.  Upset by wt gain but has no energy or motivation to do anything except work.  Has not been missing work.  Boss is expecting him to work extra for right now, but he feels physically he can't do this right now, asking for work note.  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  Working 40 hrs/wk right now.  No substance use or thoughts of harm.  Depression (if yes, do PHQ-9): YES-15  Anxiety (if yes, do VENECIA-7): YES-10  Accompanying Signs & Symptoms:  Still participating in activities that you used to  enjoy: no  Fatigue: YES  Irritability: YES  Difficulty concentrating: no  Changes in appetite: YES  Problems with sleep: yes  Heart racing/beating fast: no  Abnormally elevated, expansive, or irritable mood: YES  Persistently increased activity or energy: no  Thoughts of hurting yourself or others: no  History:  Recent stress or major life event: no  Prior depression or anxiety: yes  Family history of depression or anxiety: no  Alcohol/drug use: no  Difficulty sleeping: no  Precipitating or alleviating factors: sleeps too much   Therapies tried and outcome: none  PHQ 2/15/2018 10/11/2018 4/7/2022   PHQ-9 Total Score 2 1 15   Q9: Thoughts of better off dead/self-harm past 2 weeks Not at all Not at all Not at all     VENECIA-7 SCORE 2/15/2018 10/11/2018 4/7/2022   Total Score - - -   Total Score Incomplete 0 (minimal anxiety) 10 (moderate anxiety)   Total Score - 0 10     Assessment & Plan     Migraine with aura and without status migrainosus, not intractable  Add duloxetine  - SUMAtriptan (IMITREX) 100 MG tablet; Take 1 tablet (100 mg) by mouth at onset of headache for migraine May repeat in 2 hours if needed: max 2/day.  - DULoxetine (CYMBALTA) 30 MG capsule; Take 1 capsule (30 mg) by mouth daily    Moderate episode of recurrent major depressive disorder (H)  Begin treating.  No interest in counseling at this time - stressed we should consider this long term  - DULoxetine (CYMBALTA) 30 MG capsule; Take 1 capsule (30 mg) by mouth daily    Class 1 obesity without serious comorbidity in adult, unspecified BMI, unspecified obesity type  -monitor; he defers labs at this time    Patient Instructions   Increased quantity of imitrex for migraines  Start duloxetine (cymbalta) for depression, migraines, pain   Can take 2-3 weeks to see benefit  Recheck 3-4 weeks - labs likely then      No follow-ups on file.    JEREMIAH Colon [facility]   Islam is a 51 year old who  "presents for the following health issues    History of Present Illness     Back Pain:  He presents for follow up of back pain. Patient's back pain is a new problem.    Original cause of back pain: turning/bending  First noticed back pain: more than 1 month ago  Patient feels back pain: comes and goesLocation of back pain:  Right lower back and right side of neck  Description of back pain: other  Back pain spreads: right side of neck    Since patient first noticed back pain, pain is: gradually improving  Does back pain interfere with his job:  No  On a scale of 1-10 (10 being the worst), patient describes pain as:  4  What makes back pain worse: certain positions  Acupuncture: not helpful  Acetaminophen: not helpful  Activity or exercise: helpful  Chiropractor:  Not helpful  Cold: helpful  Heat: helpful  Massage: helpful  Muscle relaxants: not helpful  NSAIDS: helpful  Opioids: not tried  Physical Therapy: not tried  Rest: helpful  Steroid Injection: not tried  Stretching: helpful  Surgery: not tried  TENS unit: not tried  Topical pain relievers: helpful  Other healthcare providers patient is seeing for back pain: None      Today's PHQ-9         PHQ-9 Total Score: 15  PHQ-9 Q9 Thoughts of better off dead/self-harm past 2 weeks :   (P) Not at all    How difficult have these problems made it for you to do your work, take care of things at home, or get along with other people: Somewhat difficult    Today's VENECIA-7 Score: 10    Migraines:   Since the patient's last clinic visit, headaches are: worsened  The patient is getting headaches:  3 times a week  He is not able to do normal daily activities when he has a migraine.  The patient is taking the following rescue/relief medications:  Sumatriptan (Imitrex)   Patient states \"I get total relief\" from the rescue/relief medications.   The patient is taking the following medications to prevent migraines:  No medications to prevent migraines  In the past 4 weeks, the patient has " "gone to an Urgent Care or Emergency Room 0 times times due to headaches.    He eats 2-3 servings of fruits and vegetables daily.He consumes 1 sweetened beverage(s) daily.He exercises with enough effort to increase his heart rate 10 to 19 minutes per day.  He exercises with enough effort to increase his heart rate 3 or less days per week.   He is taking medications regularly.       Migraine     Since your last clinic visit, how have your headaches changed?  Worsened    How often are you getting headaches or migraines? 10 per month      Are you able to do normal daily activities when you have a migraine? No    Are you taking rescue/relief medications? (Select all that apply) sumatriptan (Imitrex)    How helpful is your rescue/relief medication?  I get total relief    Are you taking any medications to prevent migraines? (Select all that apply)  No    In the past 4 weeks, how often have you gone to urgent care or the emergency room because of your headaches?  0      Objective    /84 (BP Location: Right arm, Patient Position: Sitting, Cuff Size: Adult Regular)   Pulse 99   Temp 97.8  F (36.6  C) (Tympanic)   Resp 18   Ht 1.784 m (5' 10.24\")   Wt 100.6 kg (221 lb 12.8 oz)   SpO2 96%   BMI 31.61 kg/m    Body mass index is 31.61 kg/m .        Objective    /84 (BP Location: Right arm, Patient Position: Sitting, Cuff Size: Adult Regular)   Pulse 99   Temp 97.8  F (36.6  C) (Tympanic)   Resp 18   Ht 1.784 m (5' 10.24\")   Wt 100.6 kg (221 lb 12.8 oz)   SpO2 96%   BMI 31.61 kg/m    Body mass index is 31.61 kg/m .          "

## 2022-04-08 ASSESSMENT — ANXIETY QUESTIONNAIRES: GAD7 TOTAL SCORE: 10

## 2022-05-02 DIAGNOSIS — G43.109 MIGRAINE WITH AURA AND WITHOUT STATUS MIGRAINOSUS, NOT INTRACTABLE: ICD-10-CM

## 2022-05-02 DIAGNOSIS — F33.1 MODERATE EPISODE OF RECURRENT MAJOR DEPRESSIVE DISORDER (H): ICD-10-CM

## 2022-05-04 RX ORDER — DULOXETIN HYDROCHLORIDE 30 MG/1
30 CAPSULE, DELAYED RELEASE ORAL DAILY
Qty: 30 CAPSULE | Refills: 0 | Status: SHIPPED | OUTPATIENT
Start: 2022-05-04 | End: 2023-05-22

## 2023-05-22 ENCOUNTER — OFFICE VISIT (OUTPATIENT)
Dept: FAMILY MEDICINE | Facility: CLINIC | Age: 53
End: 2023-05-22
Payer: COMMERCIAL

## 2023-05-22 VITALS
DIASTOLIC BLOOD PRESSURE: 85 MMHG | BODY MASS INDEX: 32.5 KG/M2 | SYSTOLIC BLOOD PRESSURE: 128 MMHG | HEIGHT: 70 IN | HEART RATE: 96 BPM | OXYGEN SATURATION: 97 % | TEMPERATURE: 97.6 F | WEIGHT: 227 LBS | RESPIRATION RATE: 16 BRPM

## 2023-05-22 DIAGNOSIS — G43.109 MIGRAINE WITH AURA AND WITHOUT STATUS MIGRAINOSUS, NOT INTRACTABLE: ICD-10-CM

## 2023-05-22 DIAGNOSIS — K62.89 RECTAL LUMP: Primary | ICD-10-CM

## 2023-05-22 DIAGNOSIS — Z12.11 SCREEN FOR COLON CANCER: ICD-10-CM

## 2023-05-22 PROCEDURE — 90750 HZV VACC RECOMBINANT IM: CPT | Performed by: PHYSICIAN ASSISTANT

## 2023-05-22 PROCEDURE — 90471 IMMUNIZATION ADMIN: CPT | Performed by: PHYSICIAN ASSISTANT

## 2023-05-22 PROCEDURE — 99213 OFFICE O/P EST LOW 20 MIN: CPT | Mod: 25 | Performed by: PHYSICIAN ASSISTANT

## 2023-05-22 RX ORDER — SUMATRIPTAN 100 MG/1
100 TABLET, FILM COATED ORAL
Qty: 12 TABLET | Refills: 11 | Status: SHIPPED | OUTPATIENT
Start: 2023-05-22 | End: 2024-03-21

## 2023-05-22 ASSESSMENT — PATIENT HEALTH QUESTIONNAIRE - PHQ9
SUM OF ALL RESPONSES TO PHQ QUESTIONS 1-9: 1
SUM OF ALL RESPONSES TO PHQ QUESTIONS 1-9: 1
5. POOR APPETITE OR OVEREATING: NOT AT ALL
10. IF YOU CHECKED OFF ANY PROBLEMS, HOW DIFFICULT HAVE THESE PROBLEMS MADE IT FOR YOU TO DO YOUR WORK, TAKE CARE OF THINGS AT HOME, OR GET ALONG WITH OTHER PEOPLE: NOT DIFFICULT AT ALL

## 2023-05-22 ASSESSMENT — ANXIETY QUESTIONNAIRES
GAD7 TOTAL SCORE: 3
7. FEELING AFRAID AS IF SOMETHING AWFUL MIGHT HAPPEN: NOT AT ALL
1. FEELING NERVOUS, ANXIOUS, OR ON EDGE: SEVERAL DAYS
IF YOU CHECKED OFF ANY PROBLEMS ON THIS QUESTIONNAIRE, HOW DIFFICULT HAVE THESE PROBLEMS MADE IT FOR YOU TO DO YOUR WORK, TAKE CARE OF THINGS AT HOME, OR GET ALONG WITH OTHER PEOPLE: NOT DIFFICULT AT ALL
GAD7 TOTAL SCORE: 3
6. BECOMING EASILY ANNOYED OR IRRITABLE: SEVERAL DAYS
5. BEING SO RESTLESS THAT IT IS HARD TO SIT STILL: NOT AT ALL
3. WORRYING TOO MUCH ABOUT DIFFERENT THINGS: SEVERAL DAYS
2. NOT BEING ABLE TO STOP OR CONTROL WORRYING: NOT AT ALL

## 2023-05-22 ASSESSMENT — PAIN SCALES - GENERAL: PAINLEVEL: NO PAIN (0)

## 2023-05-22 NOTE — PROGRESS NOTES
Assessment & Plan     Rectal lump  He declines exam today as he feels from YouTube videos that this is clearly internal hemorrhoid.  - Adult General Surg Referral    Migraine with aura and without status migrainosus, not intractable  Refill   - SUMAtriptan (IMITREX) 100 MG tablet  Dispense: 12 tablet; Refill: 11    Screen for colon cancer  - Colonoscopy Screening  Referral    Patient Instructions   Refilled imitrex     Shingles vaccine today - discussed we do not know if your insurance will cover, that you would be responsible for charge if insurance does not cover, and that pharmacy knows your cost in advance.  You decided you didn't want to go through the pharmacy.    Set up to see general surgeon  Also advise to set up colonoscopy      Karen Marquez PA-C  [facility]    Harsh Retana is a 52 year old, presenting for the following health issues:  Depression and Referral (Patient is requesting a referral to proctologist. )        5/22/2023    10:30 AM   Additional Questions   Roomed by Sindy BECKMAN     History of Present Illness       Reason for visit:  Prescription refill    He eats 2-3 servings of fruits and vegetables daily.He consumes 2 sweetened beverage(s) daily.He exercises with enough effort to increase his heart rate 30 to 60 minutes per day.  He exercises with enough effort to increase his heart rate 4 days per week.   He is taking medications regularly.    Today's PHQ-9         PHQ-9 Total Score: 1    PHQ-9 Q9 Thoughts of better off dead/self-harm past 2 weeks :   Not at all    How difficult have these problems made it for you to do your work, take care of things at home, or get along with other people: Not difficult at all     Referral to proctologist, he has an internal Hemorrhoid that he had had for a year.      Depression and Anxiety Follow-Up    How are you doing with your depression since your last visit? Improved     How are you doing with your  anxiety since your last visit?  Improved     Are you having other symptoms that might be associated with depression or anxiety? Yes:  fatigue     Have you had a significant life event? No     Do you have any concerns with your use of alcohol or other drugs? No  Doing well off meds     Social History     Tobacco Use     Smoking status: Never     Smokeless tobacco: Never   Vaping Use     Vaping status: Never Used     Passive vaping exposure: Yes   Substance Use Topics     Alcohol use: No     Comment: none     Drug use: No         10/11/2018     8:36 AM 4/7/2022     3:04 PM 5/22/2023    10:27 AM   PHQ   PHQ-9 Total Score 1 15 1   Q9: Thoughts of better off dead/self-harm past 2 weeks Not at all Not at all Not at all         10/11/2018     8:37 AM 4/7/2022     3:05 PM 5/22/2023    10:50 AM   VENECIA-7 SCORE   Total Score 0 (minimal anxiety) 10 (moderate anxiety)    Total Score 0 10 3         5/22/2023    10:27 AM   Last PHQ-9   1.  Little interest or pleasure in doing things 0   2.  Feeling down, depressed, or hopeless 0   3.  Trouble falling or staying asleep, or sleeping too much 0   4.  Feeling tired or having little energy 1   5.  Poor appetite or overeating 0   6.  Feeling bad about yourself 0   7.  Trouble concentrating 0   8.  Moving slowly or restless 0   Q9: Thoughts of better off dead/self-harm past 2 weeks 0   PHQ-9 Total Score 1         5/22/2023    10:50 AM   VENECIA-7    1. Feeling nervous, anxious, or on edge 1   2. Not being able to stop or control worrying 0   3. Worrying too much about different things 1   4. Trouble relaxing 0   5. Being so restless that it is hard to sit still 0   6. Becoming easily annoyed or irritable 1   7. Feeling afraid, as if something awful might happen 0   VENECIA-7 Total Score 3   If you checked any problems, how difficult have they made it for you to do your work, take care of things at home, or get along with other people? Not difficult at all     Objective    /85 (BP Location:  "Right arm, Patient Position: Sitting, Cuff Size: Adult Large)   Pulse 96   Temp 97.6  F (36.4  C) (Tympanic)   Resp 16   Ht 1.784 m (5' 10.24\")   Wt 103 kg (227 lb)   SpO2 97%   BMI 32.35 kg/m    Body mass index is 32.35 kg/m .          "

## 2023-05-22 NOTE — PATIENT INSTRUCTIONS
Refilled imitrex     Shingles vaccine today - discussed we do not know if your insurance will cover, that you would be responsible for charge if insurance does not cover, and that pharmacy knows your cost in advance.  You decided you didn't want to go through the pharmacy.    Set up to see general surgeon  Also advise to set up colonoscopy

## 2023-05-22 NOTE — NURSING NOTE
"Chief Complaint   Patient presents with     Depression     Referral     Patient is requesting a referral to proctologist.        Initial There were no vitals taken for this visit. Estimated body mass index is 31.61 kg/m  as calculated from the following:    Height as of 4/7/22: 1.784 m (5' 10.24\").    Weight as of 4/7/22: 100.6 kg (221 lb 12.8 oz).    Patient presents to the clinic using No DME    Is there anyone who you would like to be able to receive your results? No  If yes have patient fill out PRICE    "

## 2023-05-25 ENCOUNTER — MYC MEDICAL ADVICE (OUTPATIENT)
Dept: FAMILY MEDICINE | Facility: CLINIC | Age: 53
End: 2023-05-25
Payer: COMMERCIAL

## 2023-05-25 DIAGNOSIS — K62.89 RECTAL LUMP: Primary | ICD-10-CM

## 2023-05-30 NOTE — TELEPHONE ENCOUNTER
See My Chart messages. Referral pended if you agree. Not sure which is correct or both?  Care Team to notify patient once referral signed and faxed to Dr Sheron Hernandez @ [phone]  Thank you, Esperanza CASEY RN

## 2023-06-02 ENCOUNTER — HEALTH MAINTENANCE LETTER (OUTPATIENT)
Age: 53
End: 2023-06-02

## 2023-06-08 ENCOUNTER — TELEPHONE (OUTPATIENT)
Dept: SURGERY | Facility: CLINIC | Age: 53
End: 2023-06-08
Payer: COMMERCIAL

## 2023-06-08 ENCOUNTER — HOSPITAL ENCOUNTER (OUTPATIENT)
Facility: CLINIC | Age: 53
End: 2023-06-08
Attending: SURGERY | Admitting: SURGERY
Payer: COMMERCIAL

## 2023-06-08 NOTE — TELEPHONE ENCOUNTER
Screening Questions  BLUE  KIND OF PREP RED  LOCATION [review exclusion criteria] GREEN  SEDATION TYPE        Y Are you active on mychart?       Jimmy Ordering/Referring Provider?        [employer] What type of coverage do you have?      N Have you had a positive covid test in the last 14 days?     32.35 1. BMI  [BMI 40+ - review exclusion criteria& smart-phrase document]    Y  2. Are you able to give consent for your medical care? [IF NO,RN REVIEW]          N  3. Are you taking any prescription pain medications on a routine schedule   (ex narcotics: oxycodone, roxicodone, oxycontin,  and percocet)? [RN Review]        N  3a. EXTENDED PREP What kind of prescription?     N 4. Do you have any chemical dependencies such as alcohol, street drugs, or methadone?        **If yes 3- 5 , please schedule with MAC sedation.**          IF YES TO ANY 6 - 10 - HOSPITAL SETTING ONLY.     N 6.   Do you need assistance transferring?     N 7.   Have you had a heart or lung transplant?    N 8.   Are you currently on dialysis?   N 9.   Do you use daily home oxygen?   N 10. Do you take nitroglycerin?   10a. N If yes, how often?     N 11. Are you currently pregnant?    11a. N If yes, how many weeks? [ Greater than 12 weeks, OR NEEDED]    N 12. Do you have Pulmonary Hypertension? *NEED PAC APPT AT [facility] w/ [facility]*     N 13. [review exclusion criteria]  Do you have any implantable devices in your body (pacemaker, defib, LVAD)?    N 14. In the past 6 months, have you had any heart related issues including cardiomyopathy or heart attack?     14a. N If yes, did it require cardiac stenting if so when?     N 15. Have you had a stroke or Transient ischemic attack (TIA - aka  mini stroke ) within 6 months?      N 16. Do you have mod to severe Obstructive Sleep Apnea?  [Hospital only]    N 17. Do you have SEVERE AND UNCONTROLLED asthma? *NEED PAC APPT AT [facility] w/[facility]*     18.Do you take blood thinners?  No    N 19. Do you take any of the following  "medications?    nPhentermine    NOzempic    NWegovy (Semaglutide)      19a. If yes, \"Hold for 7 days before procedure.  Please consult your prescribing provider if you have questions about holding this medication.\"     N  20. Do you have chronic kidney disease?      N  21. Do you have a diagnosis of diabetes?     N  22. On a regular basis do you go 3-5 days between bowel movements?     See below 23. Preferred [facility] for Pre Prescription         [address]        - CLOSING REMINDERS -    You will receive a call from a Nurse to review instructions and health history.  This assessment must be completed prior to your procedure.  Failure to complete the Nurse assessment may result in the procedure being cancelled.      On the day of your procedure, please designatean adult(s) who can drive you home stay with you for the next 24 hours. The medicines used in the exam will make you sleepy. You will not be able to drive.      You cannot take public transportation, ride share services, or non-medical taxi service without a responsible caregiver.  Medical transport services are allowed with the requirement that a responsible caregiver will receive you at your destination.  We require that drivers and caregivers are confirmed prior to your procedure.      - SCHEDULING DETAILS -  N & N Hospital Setting Required & If yes, what is the exclusion?   Mejia  Surgeon    9-  Date of Procedure  Lower Endoscopy [Colonoscopy]  Type of Procedure Scheduled  [facility]- If you answer yes to questions #8, #20, #21 [  pts ]Which Colonoscopy Prep was Sent?     GEN Sedation Type     N PAC / Pre-op Required                 "

## 2023-06-26 ENCOUNTER — HOSPITAL ENCOUNTER (OUTPATIENT)
Facility: CLINIC | Age: 53
Setting detail: OBSERVATION
Discharge: HOME | End: 2023-06-27
Attending: EMERGENCY MEDICINE | Admitting: SURGERY
Payer: COMMERCIAL

## 2023-06-26 ENCOUNTER — APPOINTMENT (OUTPATIENT)
Dept: CT IMAGING | Facility: CLINIC | Age: 53
End: 2023-06-26
Attending: EMERGENCY MEDICINE
Payer: COMMERCIAL

## 2023-06-26 ENCOUNTER — APPOINTMENT (OUTPATIENT)
Dept: ULTRASOUND IMAGING | Facility: CLINIC | Age: 53
End: 2023-06-26
Attending: EMERGENCY MEDICINE
Payer: COMMERCIAL

## 2023-06-26 DIAGNOSIS — K59.03 DRUG-INDUCED CONSTIPATION: ICD-10-CM

## 2023-06-26 DIAGNOSIS — K80.20 GALLSTONES: ICD-10-CM

## 2023-06-26 DIAGNOSIS — K80.20 SYMPTOMATIC CHOLELITHIASIS: ICD-10-CM

## 2023-06-26 DIAGNOSIS — R10.11 CHRONIC RUQ PAIN: Primary | ICD-10-CM

## 2023-06-26 DIAGNOSIS — G89.29 CHRONIC RUQ PAIN: Primary | ICD-10-CM

## 2023-06-26 DIAGNOSIS — K80.20 CALCULUS OF GALLBLADDER WITHOUT CHOLECYSTITIS WITHOUT OBSTRUCTION: ICD-10-CM

## 2023-06-26 LAB
ALBUMIN SERPL BCG-MCNC: 4.9 G/DL (ref 3.5–5.2)
ALP SERPL-CCNC: 99 U/L (ref 40–129)
ALT SERPL W P-5'-P-CCNC: 43 U/L (ref 0–70)
ANION GAP SERPL CALCULATED.3IONS-SCNC: 16 MMOL/L (ref 7–15)
AST SERPL W P-5'-P-CCNC: 39 U/L (ref 0–45)
BASOPHILS # BLD AUTO: 0.1 10E3/UL (ref 0–0.2)
BASOPHILS NFR BLD AUTO: 1 %
BILIRUB SERPL-MCNC: 0.8 MG/DL
BUN SERPL-MCNC: 11.3 MG/DL (ref 6–20)
CALCIUM SERPL-MCNC: 9.9 MG/DL (ref 8.6–10)
CHLORIDE SERPL-SCNC: 102 MMOL/L (ref 98–107)
CREAT SERPL-MCNC: 0.98 MG/DL (ref 0.67–1.17)
DEPRECATED HCO3 PLAS-SCNC: 22 MMOL/L (ref 22–29)
EOSINOPHIL # BLD AUTO: 0.2 10E3/UL (ref 0–0.7)
EOSINOPHIL NFR BLD AUTO: 2 %
ERYTHROCYTE [DISTWIDTH] IN BLOOD BY AUTOMATED COUNT: 13.2 % (ref 10–15)
GFR SERPL CREATININE-BSD FRML MDRD: >90 ML/MIN/1.73M2
GLUCOSE SERPL-MCNC: 131 MG/DL (ref 70–99)
HCT VFR BLD AUTO: 46.8 % (ref 40–53)
HGB BLD-MCNC: 15.9 G/DL (ref 13.3–17.7)
HOLD SPECIMEN: NORMAL
IMM GRANULOCYTES # BLD: 0 10E3/UL
IMM GRANULOCYTES NFR BLD: 0 %
LIPASE SERPL-CCNC: 29 U/L (ref 13–60)
LYMPHOCYTES # BLD AUTO: 2.2 10E3/UL (ref 0.8–5.3)
LYMPHOCYTES NFR BLD AUTO: 20 %
MCH RBC QN AUTO: 28.3 PG (ref 26.5–33)
MCHC RBC AUTO-ENTMCNC: 34 G/DL (ref 31.5–36.5)
MCV RBC AUTO: 83 FL (ref 78–100)
MONOCYTES # BLD AUTO: 0.7 10E3/UL (ref 0–1.3)
MONOCYTES NFR BLD AUTO: 7 %
NEUTROPHILS # BLD AUTO: 7.5 10E3/UL (ref 1.6–8.3)
NEUTROPHILS NFR BLD AUTO: 70 %
NRBC # BLD AUTO: 0 10E3/UL
NRBC BLD AUTO-RTO: 0 /100
PLATELET # BLD AUTO: 207 10E3/UL (ref 150–450)
POTASSIUM SERPL-SCNC: 3.9 MMOL/L (ref 3.4–5.3)
PROT SERPL-MCNC: 8.5 G/DL (ref 6.4–8.3)
RBC # BLD AUTO: 5.61 10E6/UL (ref 4.4–5.9)
SODIUM SERPL-SCNC: 140 MMOL/L (ref 136–145)
TROPONIN T SERPL HS-MCNC: <6 NG/L
WBC # BLD AUTO: 10.8 10E3/UL (ref 4–11)

## 2023-06-26 PROCEDURE — 99204 OFFICE O/P NEW MOD 45 MIN: CPT | Mod: 57 | Performed by: SURGERY

## 2023-06-26 PROCEDURE — 74177 CT ABD & PELVIS W/CONTRAST: CPT

## 2023-06-26 PROCEDURE — G0378 HOSPITAL OBSERVATION PER HR: HCPCS

## 2023-06-26 PROCEDURE — 83690 ASSAY OF LIPASE: CPT | Performed by: EMERGENCY MEDICINE

## 2023-06-26 PROCEDURE — 96365 THER/PROPH/DIAG IV INF INIT: CPT | Mod: 59 | Performed by: EMERGENCY MEDICINE

## 2023-06-26 PROCEDURE — 85025 COMPLETE CBC W/AUTO DIFF WBC: CPT | Performed by: FAMILY MEDICINE

## 2023-06-26 PROCEDURE — 99285 EMERGENCY DEPT VISIT HI MDM: CPT | Performed by: EMERGENCY MEDICINE

## 2023-06-26 PROCEDURE — 250N000011 HC RX IP 250 OP 636: Performed by: EMERGENCY MEDICINE

## 2023-06-26 PROCEDURE — 99222 1ST HOSP IP/OBS MODERATE 55: CPT | Mod: AI | Performed by: FAMILY MEDICINE

## 2023-06-26 PROCEDURE — 80053 COMPREHEN METABOLIC PANEL: CPT | Performed by: FAMILY MEDICINE

## 2023-06-26 PROCEDURE — 36415 COLL VENOUS BLD VENIPUNCTURE: CPT | Performed by: FAMILY MEDICINE

## 2023-06-26 PROCEDURE — 85025 COMPLETE CBC W/AUTO DIFF WBC: CPT | Performed by: EMERGENCY MEDICINE

## 2023-06-26 PROCEDURE — 99285 EMERGENCY DEPT VISIT HI MDM: CPT | Mod: 25 | Performed by: EMERGENCY MEDICINE

## 2023-06-26 PROCEDURE — 96375 TX/PRO/DX INJ NEW DRUG ADDON: CPT | Performed by: EMERGENCY MEDICINE

## 2023-06-26 PROCEDURE — 250N000009 HC RX 250: Performed by: EMERGENCY MEDICINE

## 2023-06-26 PROCEDURE — 80053 COMPREHEN METABOLIC PANEL: CPT | Performed by: EMERGENCY MEDICINE

## 2023-06-26 PROCEDURE — 76705 ECHO EXAM OF ABDOMEN: CPT

## 2023-06-26 PROCEDURE — 83690 ASSAY OF LIPASE: CPT | Performed by: FAMILY MEDICINE

## 2023-06-26 PROCEDURE — 93005 ELECTROCARDIOGRAM TRACING: CPT

## 2023-06-26 PROCEDURE — 84484 ASSAY OF TROPONIN QUANT: CPT | Performed by: FAMILY MEDICINE

## 2023-06-26 PROCEDURE — 84484 ASSAY OF TROPONIN QUANT: CPT | Performed by: EMERGENCY MEDICINE

## 2023-06-26 RX ORDER — HYDROMORPHONE HYDROCHLORIDE 1 MG/ML
.3-.5 INJECTION, SOLUTION INTRAMUSCULAR; INTRAVENOUS; SUBCUTANEOUS EVERY 4 HOURS PRN
Status: DISCONTINUED | OUTPATIENT
Start: 2023-06-26 | End: 2023-06-27 | Stop reason: DRUGHIGH

## 2023-06-26 RX ORDER — HYDROMORPHONE HYDROCHLORIDE 1 MG/ML
0.3 INJECTION, SOLUTION INTRAMUSCULAR; INTRAVENOUS; SUBCUTANEOUS ONCE
Status: COMPLETED | OUTPATIENT
Start: 2023-06-26 | End: 2023-06-26

## 2023-06-26 RX ORDER — SODIUM CHLORIDE 9 MG/ML
INJECTION, SOLUTION INTRAVENOUS CONTINUOUS
Status: DISCONTINUED | OUTPATIENT
Start: 2023-06-26 | End: 2023-06-27

## 2023-06-26 RX ORDER — ONDANSETRON 4 MG/1
4 TABLET, ORALLY DISINTEGRATING ORAL EVERY 6 HOURS PRN
Status: DISCONTINUED | OUTPATIENT
Start: 2023-06-26 | End: 2023-06-27

## 2023-06-26 RX ORDER — ONDANSETRON 2 MG/ML
4 INJECTION INTRAMUSCULAR; INTRAVENOUS EVERY 6 HOURS PRN
Status: DISCONTINUED | OUTPATIENT
Start: 2023-06-26 | End: 2023-06-27

## 2023-06-26 RX ORDER — NALOXONE HYDROCHLORIDE 0.4 MG/ML
0.4 INJECTION, SOLUTION INTRAMUSCULAR; INTRAVENOUS; SUBCUTANEOUS
Status: DISCONTINUED | OUTPATIENT
Start: 2023-06-26 | End: 2023-06-27 | Stop reason: HOSPADM

## 2023-06-26 RX ORDER — NALOXONE HYDROCHLORIDE 0.4 MG/ML
0.2 INJECTION, SOLUTION INTRAMUSCULAR; INTRAVENOUS; SUBCUTANEOUS
Status: DISCONTINUED | OUTPATIENT
Start: 2023-06-26 | End: 2023-06-27 | Stop reason: HOSPADM

## 2023-06-26 RX ORDER — IOPAMIDOL 755 MG/ML
100 INJECTION, SOLUTION INTRAVASCULAR ONCE
Status: COMPLETED | OUTPATIENT
Start: 2023-06-26 | End: 2023-06-26

## 2023-06-26 RX ORDER — KETOROLAC TROMETHAMINE 15 MG/ML
15 INJECTION, SOLUTION INTRAMUSCULAR; INTRAVENOUS ONCE
Status: COMPLETED | OUTPATIENT
Start: 2023-06-26 | End: 2023-06-26

## 2023-06-26 RX ADMIN — HYDROMORPHONE HYDROCHLORIDE 0.3 MG: 1 INJECTION, SOLUTION INTRAMUSCULAR; INTRAVENOUS; SUBCUTANEOUS at 19:40

## 2023-06-26 RX ADMIN — SODIUM CHLORIDE 67 ML: 9 INJECTION, SOLUTION INTRAVENOUS at 19:00

## 2023-06-26 RX ADMIN — KETOROLAC TROMETHAMINE 15 MG: 15 INJECTION, SOLUTION INTRAMUSCULAR; INTRAVENOUS at 18:32

## 2023-06-26 RX ADMIN — PIPERACILLIN SODIUM AND TAZOBACTAM SODIUM 3.38 G: 3; .375 INJECTION, SOLUTION INTRAVENOUS at 22:46

## 2023-06-26 RX ADMIN — IOPAMIDOL 100 ML: 755 INJECTION, SOLUTION INTRAVENOUS at 19:00

## 2023-06-26 ASSESSMENT — ACTIVITIES OF DAILY LIVING (ADL)
ADLS_ACUITY_SCORE: 35
ADLS_ACUITY_SCORE: 33
ADLS_ACUITY_SCORE: 35
ADLS_ACUITY_SCORE: 35

## 2023-06-26 NOTE — LETTER
[facility] SURGICAL  [address] [address]  Phone: [phone]  Fax: [phone]    June 27, 2023        Mazin Herbert  [id number]  [place] [address]          To whom it may concern:    RE: Mazin Herbert    Had surgery on June 27, 2023 and will not be able to lift more than 20 pounds for 4 weeks after surgery.   He will also be tired and need to take breaks.  He may return to work in the next 2 weeks.     Please contact me for questions or concerns.      Sincerely,        Leonel Oro MD

## 2023-06-26 NOTE — ED TRIAGE NOTES
Pt here with upper abdominal pain/chest pain for 2.5 days. Pt is nauseated, vomited once a couple days ago. Pt went on an eating binge Friday and that is when all of his pain started. Hx of gallstones in his past.      Triage Assessment     Row Name 06/26/23 [id number]       Triage Assessment (Adult)    Airway WDL WDL       Respiratory WDL    Respiratory WDL WDL       Cardiac WDL    Cardiac WDL X;chest pain  upper abdomen/chest       Cognitive/Neuro/Behavioral WDL    Cognitive/Neuro/Behavioral WDL WDL

## 2023-06-26 NOTE — LETTER
"July 5, 2023      [facility]  [id number]  [place] [address]        Dear ,    We are writing to inform you of your test results.    So no cancer.    Was what we thought it would be.  An inflamed gallbladder with out any cancer.   Please follow up to make sure you are doing well.  Leonel Oro MD         Resulted Orders   Surgical Pathology Exam   Result Value Ref Range    Case Report       Surgical Pathology Report                         Case: [id number]                                  Authorizing Provider:  Leonel Oro MD Collected:           06/27/2023 05:58 AM          Ordering Location:     [facility]    Received:            06/27/2023 07:33 AM                                 Main OR                                                                      Pathologist:           Mali Freire MD PhD                                                      Specimen:    Gallbladder                                                                                Final Diagnosis       A(1). Gallbladder, cholecystectomy:  -Acute cholecystitis, and cholelithiasis.  -Negative for dysplasia or malignancy.        Clinical Information       Procedure:  CHOLECYSTECTOMY, LAPAROSCOPIC  Pre-op Diagnosis: Chronic RUQ pain [R10.11, G89.29]  Gallstones [K80.20]  Post-op Diagnosis: R10.11, G89.29 - Chronic RUQ pain [ICD-10-CM]  K80.20 - Gallstones [ICD-10-CM]      Gross Description       A(1). Gallbladder, Gallbladder:  The specimen is received in formalin, labeled with the patient's name, medical record number and other identifying information designated \"gallbladder\". It consists of a 11.0 x 5.0 x 4.5 cm tan-pink, intact, fluctuant gallbladder.  The cystic duct margin is entirely inked black.  The specimen is opened, releases a tan-brown bile with a single 3.5 cm tan-brown, gritty calculus, displays a pale-tan, velvety to trabeculated mucosa.  No obvious mass " nodularities are grossly identified.  Representative sections of the gallbladder to include the cystic duct margin (inked black) are submitted in 1 cassette.   (Kaia Guerrero)      Microscopic Description       Microscopic examination was performed.      Performing Labs       The technical component of this testing was completed at [facility]      Case Images         If you have any questions or concerns, please call the clinic at the number listed above.       Sincerely,      Leonel Oro MD

## 2023-06-27 ENCOUNTER — TELEPHONE (OUTPATIENT)
Dept: SURGERY | Facility: CLINIC | Age: 53
End: 2023-06-27

## 2023-06-27 ENCOUNTER — ANESTHESIA (OUTPATIENT)
Dept: SURGERY | Facility: CLINIC | Age: 53
End: 2023-06-27
Payer: COMMERCIAL

## 2023-06-27 ENCOUNTER — ANESTHESIA EVENT (OUTPATIENT)
Dept: SURGERY | Facility: CLINIC | Age: 53
End: 2023-06-27
Payer: COMMERCIAL

## 2023-06-27 VITALS
TEMPERATURE: 98.2 F | SYSTOLIC BLOOD PRESSURE: 109 MMHG | HEIGHT: 71 IN | BODY MASS INDEX: 29.51 KG/M2 | HEART RATE: 73 BPM | RESPIRATION RATE: 18 BRPM | DIASTOLIC BLOOD PRESSURE: 71 MMHG | WEIGHT: 210.76 LBS | OXYGEN SATURATION: 94 %

## 2023-06-27 LAB
ALBUMIN SERPL BCG-MCNC: 4.6 G/DL (ref 3.5–5.2)
ALP SERPL-CCNC: 92 U/L (ref 40–129)
ALT SERPL W P-5'-P-CCNC: 48 U/L (ref 0–70)
ANION GAP SERPL CALCULATED.3IONS-SCNC: 12 MMOL/L (ref 7–15)
AST SERPL W P-5'-P-CCNC: 34 U/L (ref 0–45)
BILIRUB SERPL-MCNC: 1 MG/DL
BUN SERPL-MCNC: 16.5 MG/DL (ref 6–20)
CALCIUM SERPL-MCNC: 9.3 MG/DL (ref 8.6–10)
CHLORIDE SERPL-SCNC: 103 MMOL/L (ref 98–107)
CREAT SERPL-MCNC: 1.01 MG/DL (ref 0.67–1.17)
DEPRECATED HCO3 PLAS-SCNC: 26 MMOL/L (ref 22–29)
ERYTHROCYTE [DISTWIDTH] IN BLOOD BY AUTOMATED COUNT: 13.2 % (ref 10–15)
GFR SERPL CREATININE-BSD FRML MDRD: >90 ML/MIN/1.73M2
GLUCOSE SERPL-MCNC: 95 MG/DL (ref 70–99)
GRAM STAIN RESULT: NORMAL
GRAM STAIN RESULT: NORMAL
HCT VFR BLD AUTO: 43.4 % (ref 40–53)
HGB BLD-MCNC: 14.6 G/DL (ref 13.3–17.7)
MCH RBC QN AUTO: 27.9 PG (ref 26.5–33)
MCHC RBC AUTO-ENTMCNC: 33.6 G/DL (ref 31.5–36.5)
MCV RBC AUTO: 83 FL (ref 78–100)
PLATELET # BLD AUTO: 192 10E3/UL (ref 150–450)
POTASSIUM SERPL-SCNC: 3.6 MMOL/L (ref 3.4–5.3)
PROT SERPL-MCNC: 7.4 G/DL (ref 6.4–8.3)
RBC # BLD AUTO: 5.23 10E6/UL (ref 4.4–5.9)
SODIUM SERPL-SCNC: 141 MMOL/L (ref 136–145)
WBC # BLD AUTO: 10 10E3/UL (ref 4–11)

## 2023-06-27 PROCEDURE — 80053 COMPREHEN METABOLIC PANEL: CPT | Performed by: FAMILY MEDICINE

## 2023-06-27 PROCEDURE — 250N000013 HC RX MED GY IP 250 OP 250 PS 637: Performed by: SURGERY

## 2023-06-27 PROCEDURE — 258N000003 HC RX IP 258 OP 636: Performed by: FAMILY MEDICINE

## 2023-06-27 PROCEDURE — 85027 COMPLETE CBC AUTOMATED: CPT | Performed by: FAMILY MEDICINE

## 2023-06-27 PROCEDURE — 710N000009 HC RECOVERY PHASE 1, LEVEL 1, PER MIN: Performed by: SURGERY

## 2023-06-27 PROCEDURE — 250N000011 HC RX IP 250 OP 636: Mod: JZ

## 2023-06-27 PROCEDURE — 370N000017 HC ANESTHESIA TECHNICAL FEE, PER MIN: Performed by: SURGERY

## 2023-06-27 PROCEDURE — 88304 TISSUE EXAM BY PATHOLOGIST: CPT | Mod: TC | Performed by: SURGERY

## 2023-06-27 PROCEDURE — 250N000025 HC SEVOFLURANE, PER MIN: Performed by: SURGERY

## 2023-06-27 PROCEDURE — 87075 CULTR BACTERIA EXCEPT BLOOD: CPT | Performed by: SURGERY

## 2023-06-27 PROCEDURE — 87070 CULTURE OTHR SPECIMN AEROBIC: CPT | Performed by: SURGERY

## 2023-06-27 PROCEDURE — 250N000011 HC RX IP 250 OP 636: Performed by: FAMILY MEDICINE

## 2023-06-27 PROCEDURE — 258N000003 HC RX IP 258 OP 636

## 2023-06-27 PROCEDURE — 271N000001 HC OR GENERAL SUPPLY NON-STERILE: Performed by: SURGERY

## 2023-06-27 PROCEDURE — 250N000011 HC RX IP 250 OP 636

## 2023-06-27 PROCEDURE — 96376 TX/PRO/DX INJ SAME DRUG ADON: CPT | Mod: XU

## 2023-06-27 PROCEDURE — 88304 TISSUE EXAM BY PATHOLOGIST: CPT | Mod: 26 | Performed by: PATHOLOGY

## 2023-06-27 PROCEDURE — 250N000011 HC RX IP 250 OP 636: Performed by: EMERGENCY MEDICINE

## 2023-06-27 PROCEDURE — 272N000001 HC OR GENERAL SUPPLY STERILE: Performed by: SURGERY

## 2023-06-27 PROCEDURE — 360N000076 HC SURGERY LEVEL 3, PER MIN: Performed by: SURGERY

## 2023-06-27 PROCEDURE — 250N000011 HC RX IP 250 OP 636: Mod: JZ | Performed by: FAMILY MEDICINE

## 2023-06-27 PROCEDURE — 99238 HOSP IP/OBS DSCHRG MGMT 30/<: CPT | Performed by: INTERNAL MEDICINE

## 2023-06-27 PROCEDURE — 96361 HYDRATE IV INFUSION ADD-ON: CPT

## 2023-06-27 PROCEDURE — 87205 SMEAR GRAM STAIN: CPT | Performed by: SURGERY

## 2023-06-27 PROCEDURE — 36415 COLL VENOUS BLD VENIPUNCTURE: CPT | Performed by: FAMILY MEDICINE

## 2023-06-27 PROCEDURE — 250N000009 HC RX 250: Performed by: SURGERY

## 2023-06-27 PROCEDURE — 250N000009 HC RX 250

## 2023-06-27 PROCEDURE — 47562 LAPAROSCOPIC CHOLECYSTECTOMY: CPT | Performed by: SURGERY

## 2023-06-27 PROCEDURE — G0378 HOSPITAL OBSERVATION PER HR: HCPCS

## 2023-06-27 RX ORDER — SODIUM CHLORIDE, SODIUM LACTATE, POTASSIUM CHLORIDE, CALCIUM CHLORIDE 600; 310; 30; 20 MG/100ML; MG/100ML; MG/100ML; MG/100ML
INJECTION, SOLUTION INTRAVENOUS CONTINUOUS PRN
Status: DISCONTINUED | OUTPATIENT
Start: 2023-06-27 | End: 2023-06-27

## 2023-06-27 RX ORDER — OXYCODONE HYDROCHLORIDE 5 MG/1
10 TABLET ORAL EVERY 4 HOURS PRN
Status: DISCONTINUED | OUTPATIENT
Start: 2023-06-27 | End: 2023-06-27 | Stop reason: HOSPADM

## 2023-06-27 RX ORDER — KETAMINE HYDROCHLORIDE 10 MG/ML
INJECTION INTRAMUSCULAR; INTRAVENOUS PRN
Status: DISCONTINUED | OUTPATIENT
Start: 2023-06-27 | End: 2023-06-27

## 2023-06-27 RX ORDER — CEFAZOLIN SODIUM/WATER 2 G/20 ML
2 SYRINGE (ML) INTRAVENOUS
Status: DISCONTINUED | OUTPATIENT
Start: 2023-06-27 | End: 2023-06-27 | Stop reason: HOSPADM

## 2023-06-27 RX ORDER — SODIUM CHLORIDE 9 MG/ML
INJECTION, SOLUTION INTRAVENOUS CONTINUOUS
Status: DISCONTINUED | OUTPATIENT
Start: 2023-06-27 | End: 2023-06-27 | Stop reason: HOSPADM

## 2023-06-27 RX ORDER — OXYCODONE HYDROCHLORIDE 5 MG/1
5 TABLET ORAL EVERY 4 HOURS PRN
Status: DISCONTINUED | OUTPATIENT
Start: 2023-06-27 | End: 2023-06-27 | Stop reason: HOSPADM

## 2023-06-27 RX ORDER — DEXAMETHASONE SODIUM PHOSPHATE 4 MG/ML
INJECTION, SOLUTION INTRA-ARTICULAR; INTRALESIONAL; INTRAMUSCULAR; INTRAVENOUS; SOFT TISSUE PRN
Status: DISCONTINUED | OUTPATIENT
Start: 2023-06-27 | End: 2023-06-27

## 2023-06-27 RX ORDER — FENTANYL CITRATE 50 UG/ML
25 INJECTION, SOLUTION INTRAMUSCULAR; INTRAVENOUS EVERY 5 MIN PRN
Status: DISCONTINUED | OUTPATIENT
Start: 2023-06-27 | End: 2023-06-27 | Stop reason: HOSPADM

## 2023-06-27 RX ORDER — LIDOCAINE 40 MG/G
CREAM TOPICAL
Status: DISCONTINUED | OUTPATIENT
Start: 2023-06-27 | End: 2023-06-27 | Stop reason: HOSPADM

## 2023-06-27 RX ORDER — PROPOFOL 10 MG/ML
INJECTION, EMULSION INTRAVENOUS PRN
Status: DISCONTINUED | OUTPATIENT
Start: 2023-06-27 | End: 2023-06-27

## 2023-06-27 RX ORDER — MAGNESIUM SULFATE HEPTAHYDRATE 40 MG/ML
INJECTION, SOLUTION INTRAVENOUS PRN
Status: DISCONTINUED | OUTPATIENT
Start: 2023-06-27 | End: 2023-06-27

## 2023-06-27 RX ORDER — HYDROMORPHONE HCL IN WATER/PF 6 MG/30 ML
0.4 PATIENT CONTROLLED ANALGESIA SYRINGE INTRAVENOUS EVERY 5 MIN PRN
Status: DISCONTINUED | OUTPATIENT
Start: 2023-06-27 | End: 2023-06-27 | Stop reason: HOSPADM

## 2023-06-27 RX ORDER — ONDANSETRON 4 MG/1
4 TABLET, ORALLY DISINTEGRATING ORAL EVERY 30 MIN PRN
Status: DISCONTINUED | OUTPATIENT
Start: 2023-06-27 | End: 2023-06-27 | Stop reason: HOSPADM

## 2023-06-27 RX ORDER — SENNOSIDES 8.6 MG
1-2 TABLET ORAL 2 TIMES DAILY PRN
COMMUNITY
Start: 2023-06-27

## 2023-06-27 RX ORDER — FENTANYL CITRATE 50 UG/ML
INJECTION, SOLUTION INTRAMUSCULAR; INTRAVENOUS PRN
Status: DISCONTINUED | OUTPATIENT
Start: 2023-06-27 | End: 2023-06-27

## 2023-06-27 RX ORDER — CEFAZOLIN SODIUM/WATER 2 G/20 ML
2 SYRINGE (ML) INTRAVENOUS SEE ADMIN INSTRUCTIONS
Status: DISCONTINUED | OUTPATIENT
Start: 2023-06-27 | End: 2023-06-27 | Stop reason: HOSPADM

## 2023-06-27 RX ORDER — ONDANSETRON 2 MG/ML
4 INJECTION INTRAMUSCULAR; INTRAVENOUS EVERY 30 MIN PRN
Status: DISCONTINUED | OUTPATIENT
Start: 2023-06-27 | End: 2023-06-27 | Stop reason: HOSPADM

## 2023-06-27 RX ORDER — OXYCODONE HYDROCHLORIDE 5 MG/1
5 TABLET ORAL EVERY 4 HOURS PRN
Status: DISCONTINUED | OUTPATIENT
Start: 2023-06-27 | End: 2023-06-27 | Stop reason: DRUGHIGH

## 2023-06-27 RX ORDER — ACETAMINOPHEN 325 MG/1
650 TABLET ORAL EVERY 4 HOURS PRN
Status: DISCONTINUED | OUTPATIENT
Start: 2023-06-27 | End: 2023-06-27 | Stop reason: HOSPADM

## 2023-06-27 RX ORDER — HYDROMORPHONE HCL IN WATER/PF 6 MG/30 ML
0.2 PATIENT CONTROLLED ANALGESIA SYRINGE INTRAVENOUS EVERY 5 MIN PRN
Status: DISCONTINUED | OUTPATIENT
Start: 2023-06-27 | End: 2023-06-27 | Stop reason: HOSPADM

## 2023-06-27 RX ORDER — HEPARIN SODIUM 5000 [USP'U]/.5ML
5000 INJECTION, SOLUTION INTRAVENOUS; SUBCUTANEOUS
Status: DISCONTINUED | OUTPATIENT
Start: 2023-06-27 | End: 2023-06-27 | Stop reason: HOSPADM

## 2023-06-27 RX ORDER — POLYETHYLENE GLYCOL 3350 17 G/17G
1 POWDER, FOR SOLUTION ORAL DAILY PRN
COMMUNITY
Start: 2023-06-27

## 2023-06-27 RX ORDER — FENTANYL CITRATE 50 UG/ML
50 INJECTION, SOLUTION INTRAMUSCULAR; INTRAVENOUS EVERY 5 MIN PRN
Status: DISCONTINUED | OUTPATIENT
Start: 2023-06-27 | End: 2023-06-27 | Stop reason: HOSPADM

## 2023-06-27 RX ORDER — HYDROMORPHONE HCL IN WATER/PF 6 MG/30 ML
0.2 PATIENT CONTROLLED ANALGESIA SYRINGE INTRAVENOUS
Status: DISCONTINUED | OUTPATIENT
Start: 2023-06-27 | End: 2023-06-27 | Stop reason: HOSPADM

## 2023-06-27 RX ORDER — CEFAZOLIN SODIUM 1 G/3ML
INJECTION, POWDER, FOR SOLUTION INTRAMUSCULAR; INTRAVENOUS PRN
Status: DISCONTINUED | OUTPATIENT
Start: 2023-06-27 | End: 2023-06-27

## 2023-06-27 RX ORDER — ONDANSETRON 4 MG/1
4 TABLET, ORALLY DISINTEGRATING ORAL EVERY 6 HOURS PRN
Status: DISCONTINUED | OUTPATIENT
Start: 2023-06-27 | End: 2023-06-27 | Stop reason: HOSPADM

## 2023-06-27 RX ORDER — BUPIVACAINE HYDROCHLORIDE AND EPINEPHRINE 2.5; 5 MG/ML; UG/ML
INJECTION, SOLUTION INFILTRATION; PERINEURAL PRN
Status: DISCONTINUED | OUTPATIENT
Start: 2023-06-27 | End: 2023-06-27 | Stop reason: HOSPADM

## 2023-06-27 RX ORDER — OXYCODONE HYDROCHLORIDE 5 MG/1
5 TABLET ORAL EVERY 4 HOURS PRN
Qty: 16 TABLET | Refills: 0 | Status: SHIPPED | OUTPATIENT
Start: 2023-06-27 | End: 2023-06-30

## 2023-06-27 RX ORDER — SODIUM CHLORIDE, SODIUM LACTATE, POTASSIUM CHLORIDE, CALCIUM CHLORIDE 600; 310; 30; 20 MG/100ML; MG/100ML; MG/100ML; MG/100ML
INJECTION, SOLUTION INTRAVENOUS CONTINUOUS
Status: DISCONTINUED | OUTPATIENT
Start: 2023-06-27 | End: 2023-06-27 | Stop reason: HOSPADM

## 2023-06-27 RX ORDER — ONDANSETRON 2 MG/ML
4 INJECTION INTRAMUSCULAR; INTRAVENOUS EVERY 6 HOURS PRN
Status: DISCONTINUED | OUTPATIENT
Start: 2023-06-27 | End: 2023-06-27 | Stop reason: HOSPADM

## 2023-06-27 RX ORDER — HYDROMORPHONE HCL IN WATER/PF 6 MG/30 ML
0.4 PATIENT CONTROLLED ANALGESIA SYRINGE INTRAVENOUS
Status: DISCONTINUED | OUTPATIENT
Start: 2023-06-27 | End: 2023-06-27 | Stop reason: HOSPADM

## 2023-06-27 RX ORDER — IBUPROFEN 200 MG
200 TABLET ORAL EVERY 6 HOURS PRN
Qty: 30 TABLET | Refills: 0 | Status: SHIPPED | OUTPATIENT
Start: 2023-06-27

## 2023-06-27 RX ORDER — ONDANSETRON 4 MG/1
4 TABLET, ORALLY DISINTEGRATING ORAL EVERY 6 HOURS PRN
Status: DISCONTINUED | OUTPATIENT
Start: 2023-06-27 | End: 2023-06-27

## 2023-06-27 RX ORDER — ONDANSETRON 2 MG/ML
4 INJECTION INTRAMUSCULAR; INTRAVENOUS EVERY 6 HOURS PRN
Status: DISCONTINUED | OUTPATIENT
Start: 2023-06-27 | End: 2023-06-27

## 2023-06-27 RX ADMIN — SODIUM CHLORIDE, POTASSIUM CHLORIDE, SODIUM LACTATE AND CALCIUM CHLORIDE: 600; 310; 30; 20 INJECTION, SOLUTION INTRAVENOUS at 05:30

## 2023-06-27 RX ADMIN — DEXAMETHASONE SODIUM PHOSPHATE 8 MG: 4 INJECTION, SOLUTION INTRA-ARTICULAR; INTRALESIONAL; INTRAMUSCULAR; INTRAVENOUS; SOFT TISSUE at 06:25

## 2023-06-27 RX ADMIN — SODIUM CHLORIDE, PRESERVATIVE FREE: 5 INJECTION INTRAVENOUS at 04:23

## 2023-06-27 RX ADMIN — PIPERACILLIN SODIUM AND TAZOBACTAM SODIUM 3.38 G: 3; .375 INJECTION, SOLUTION INTRAVENOUS at 10:33

## 2023-06-27 RX ADMIN — KETAMINE HYDROCHLORIDE 10 MG: 10 INJECTION, SOLUTION INTRAMUSCULAR; INTRAVENOUS at 05:53

## 2023-06-27 RX ADMIN — FENTANYL CITRATE 50 MCG: 50 INJECTION, SOLUTION INTRAMUSCULAR; INTRAVENOUS at 06:00

## 2023-06-27 RX ADMIN — CEFAZOLIN 2 G: 1 INJECTION, POWDER, FOR SOLUTION INTRAMUSCULAR; INTRAVENOUS at 05:25

## 2023-06-27 RX ADMIN — ONDANSETRON 4 MG: 2 INJECTION INTRAMUSCULAR; INTRAVENOUS at 06:25

## 2023-06-27 RX ADMIN — Medication 100 MG: at 05:35

## 2023-06-27 RX ADMIN — PROPOFOL 200 MG: 10 INJECTION, EMULSION INTRAVENOUS at 05:34

## 2023-06-27 RX ADMIN — PHENYLEPHRINE HYDROCHLORIDE 100 MCG: 10 INJECTION INTRAVENOUS at 06:25

## 2023-06-27 RX ADMIN — FENTANYL CITRATE 50 MCG: 50 INJECTION, SOLUTION INTRAMUSCULAR; INTRAVENOUS at 08:03

## 2023-06-27 RX ADMIN — PHENYLEPHRINE HYDROCHLORIDE 100 MCG: 10 INJECTION INTRAVENOUS at 05:44

## 2023-06-27 RX ADMIN — PHENYLEPHRINE HYDROCHLORIDE 100 MCG: 10 INJECTION INTRAVENOUS at 06:35

## 2023-06-27 RX ADMIN — FENTANYL CITRATE 50 MCG: 50 INJECTION, SOLUTION INTRAMUSCULAR; INTRAVENOUS at 06:07

## 2023-06-27 RX ADMIN — HYDROMORPHONE HYDROCHLORIDE 0.5 MG: 1 INJECTION, SOLUTION INTRAMUSCULAR; INTRAVENOUS; SUBCUTANEOUS at 00:16

## 2023-06-27 RX ADMIN — PHENYLEPHRINE HYDROCHLORIDE 100 MCG: 10 INJECTION INTRAVENOUS at 06:09

## 2023-06-27 RX ADMIN — KETAMINE HYDROCHLORIDE 30 MG: 10 INJECTION, SOLUTION INTRAMUSCULAR; INTRAVENOUS at 05:40

## 2023-06-27 RX ADMIN — OXYCODONE HYDROCHLORIDE 5 MG: 5 TABLET ORAL at 10:41

## 2023-06-27 RX ADMIN — PIPERACILLIN SODIUM AND TAZOBACTAM SODIUM 3.38 G: 3; .375 INJECTION, SOLUTION INTRAVENOUS at 04:12

## 2023-06-27 RX ADMIN — FENTANYL CITRATE 50 MCG: 50 INJECTION, SOLUTION INTRAMUSCULAR; INTRAVENOUS at 05:37

## 2023-06-27 RX ADMIN — PHENYLEPHRINE HYDROCHLORIDE 100 MCG: 10 INJECTION INTRAVENOUS at 06:20

## 2023-06-27 RX ADMIN — KETAMINE HYDROCHLORIDE 10 MG: 10 INJECTION, SOLUTION INTRAMUSCULAR; INTRAVENOUS at 06:07

## 2023-06-27 RX ADMIN — HYDROMORPHONE HYDROCHLORIDE 0.5 MG: 1 INJECTION, SOLUTION INTRAMUSCULAR; INTRAVENOUS; SUBCUTANEOUS at 04:24

## 2023-06-27 RX ADMIN — PHENYLEPHRINE HYDROCHLORIDE 100 MCG: 10 INJECTION INTRAVENOUS at 06:31

## 2023-06-27 RX ADMIN — MIDAZOLAM 2 MG: 1 INJECTION INTRAMUSCULAR; INTRAVENOUS at 05:30

## 2023-06-27 RX ADMIN — PIPERACILLIN SODIUM AND TAZOBACTAM SODIUM 3.38 G: 3; .375 INJECTION, SOLUTION INTRAVENOUS at 15:47

## 2023-06-27 RX ADMIN — FENTANYL CITRATE 50 MCG: 50 INJECTION, SOLUTION INTRAMUSCULAR; INTRAVENOUS at 05:33

## 2023-06-27 RX ADMIN — SUGAMMADEX 200 MG: 100 INJECTION, SOLUTION INTRAVENOUS at 06:43

## 2023-06-27 RX ADMIN — MAGNESIUM SULFATE HEPTAHYDRATE 2 G: 40 INJECTION, SOLUTION INTRAVENOUS at 06:10

## 2023-06-27 RX ADMIN — ROCURONIUM BROMIDE 50 MG: 50 INJECTION, SOLUTION INTRAVENOUS at 05:35

## 2023-06-27 RX ADMIN — SODIUM CHLORIDE, PRESERVATIVE FREE: 5 INJECTION INTRAVENOUS at 00:53

## 2023-06-27 RX ADMIN — ONDANSETRON 4 MG: 2 INJECTION INTRAMUSCULAR; INTRAVENOUS at 07:47

## 2023-06-27 RX ADMIN — OXYCODONE HYDROCHLORIDE 5 MG: 5 TABLET ORAL at 17:43

## 2023-06-27 ASSESSMENT — ACTIVITIES OF DAILY LIVING (ADL)
ADLS_ACUITY_SCORE: 18
DRESSING/BATHING_DIFFICULTY: NO
ADLS_ACUITY_SCORE: 18
FALL_HISTORY_WITHIN_LAST_SIX_MONTHS: NO
CONCENTRATING,_REMEMBERING_OR_MAKING_DECISIONS_DIFFICULTY: NO
ADLS_ACUITY_SCORE: 18
ADLS_ACUITY_SCORE: 18
DIFFICULTY_EATING/SWALLOWING: NO
DIFFICULTY_COMMUNICATING: NO
ADLS_ACUITY_SCORE: 18
WALKING_OR_CLIMBING_STAIRS_DIFFICULTY: NO
ADLS_ACUITY_SCORE: 18
WEAR_GLASSES_OR_BLIND: NO
DOING_ERRANDS_INDEPENDENTLY_DIFFICULTY: NO
ADLS_ACUITY_SCORE: 18
CHANGE_IN_FUNCTIONAL_STATUS_SINCE_ONSET_OF_CURRENT_ILLNESS/INJURY: NO
TOILETING_ISSUES: NO
ADLS_ACUITY_SCORE: 18
HEARING_DIFFICULTY_OR_DEAF: NO
ADLS_ACUITY_SCORE: 18

## 2023-06-27 NOTE — ED PROVIDER NOTES
History     Chief Complaint   Patient presents with     Abdominal Pain     Upper abdominal pain constant for the past 2.5 days, initially pt said chest pain but is pointing to the upper abdomen.      Chest Pain     HPI  Mazin Herbert is a 52 year old male with past medical history significant for migraines TMJ anxiety depression who presents emergency department complaining of right upper quadrant abdominal pain/chest pain for the past few days.  Patient states he ate large amount of food on Friday and began having significant pain that night over the last few days he has had nausea and vomited a couple days ago has been having significant pain ever since that waxing his is and wanes but never goes away at its worse it is at 8 out of 10 at its best a 2 out of 10.  He does not think he has had any fevers denies any chills denies any visual changes neck pain the pain does radiate up up into his chest but.  He denies shortness of breath has not had any diarrhea he denies any blood in his stool has not any focal numbness weakness any extremity or bowel or bladder dysfunction.    Allergies:  No Known Allergies    Problem List:    Patient Active Problem List    Diagnosis Date Noted     Class 1 obesity without serious comorbidity in adult, unspecified BMI, unspecified obesity type 04/07/2022     Priority: Medium     Generalized anxiety disorder 10/05/2010     Priority: Medium     Diagnosis updated by automated process. Provider to review and confirm.       Moderate episode of recurrent major depressive disorder (H) 06/22/2010     Priority: Medium     Dx 2-3 years ago        Migraine with aura and without status migrainosus, not intractable 06/22/2010     Priority: Medium     Dx 2 years        TMJ (temporomandibular joint syndrome) 06/22/2010     Priority: Medium        Past Medical History:    Past Medical History:   Diagnosis Date     Depressive disorder        Past Surgical History:    Past Surgical History:  "  Procedure Laterality Date     TONSILLECTOMY         Family History:    Family History   Problem Relation Age of Onset     Alcohol/Drug Mother         alcohol     C.A.D. Father         first MI age 37 yrs old-3 hearts attacks after that, open heart surgery     Diabetes Father      Hypertension Father      Alcohol/Drug Father         alcohol     Depression Father      Gastrointestinal Disease Father         gallbladder removed     Lipids Father      Gynecology Sister         fibroids       Social History:  Marital Status:  Patient Declined [9]  Social History     Tobacco Use     Smoking status: Never     Smokeless tobacco: Never   Vaping Use     Vaping Use: Never used   Substance Use Topics     Alcohol use: No     Comment: none     Drug use: No        Medications:    SUMAtriptan (IMITREX) 100 MG tablet          Review of Systems  As per HPI.  Physical Exam   BP: 133/85  Pulse: 116  Temp: 98.6  F (37  C)  Resp: 20  Height: 177.8 cm (5' 10\")  Weight: 99.8 kg (220 lb)  SpO2: 99 %      Physical Exam  Vitals and nursing note reviewed.   Constitutional:       Appearance: He is well-developed. He is not ill-appearing, toxic-appearing or diaphoretic.      Comments: Moderate distress secondary to abdominal pain.   HENT:      Head: Normocephalic and atraumatic.      Mouth/Throat:      Mouth: Mucous membranes are moist.   Eyes:      Conjunctiva/sclera: Conjunctivae normal.   Cardiovascular:      Rate and Rhythm: Normal rate and regular rhythm.      Pulses: Normal pulses.      Heart sounds: Normal heart sounds. No murmur heard.  Pulmonary:      Effort: Pulmonary effort is normal.      Breath sounds: Normal breath sounds. No stridor. No wheezing or rhonchi.   Abdominal:      General: Abdomen is flat.      Palpations: Abdomen is soft.      Comments: Nondistended tender to palpation of the epigastric and right upper quadrant mild guarding no rebound bowel sounds are positive no lower abdominal tenderness is present. "   Musculoskeletal:         General: No swelling or tenderness. Normal range of motion.      Cervical back: Normal range of motion and neck supple.      Right lower leg: No edema.      Left lower leg: No edema.   Skin:     General: Skin is warm and dry.      Capillary Refill: Capillary refill takes less than 2 seconds.      Findings: No rash.   Neurological:      General: No focal deficit present.      Mental Status: He is alert and oriented to person, place, and time.      Sensory: No sensory deficit.      Motor: No weakness.      Coordination: Coordination normal.   Psychiatric:         Mood and Affect: Mood normal.         ED Course                 Procedures              Critical Care time:  none               Results for orders placed or performed during the hospital encounter of 06/26/23 (from the past 24 hour(s))   [place] Draw    Narrative    The following orders were created for panel order [place] Draw.  Procedure                               Abnormality         Status                     ---------                               -----------         ------                     Extra Blue Top Tube[[id number]]                              Final result               Extra Red Top Tube[[id number]]                               Final result               Extra Green Top (Lithium...[[id number]]                      Final result               Extra Purple Top Tube[[id number]]                            Final result                 Please view results for these tests on the individual orders.   Extra Blue Top Tube   Result Value Ref Range    Hold Specimen JIC    Extra Red Top Tube   Result Value Ref Range    Hold Specimen JIC    Extra Green Top (Lithium Heparin) Tube   Result Value Ref Range    Hold Specimen jic    Extra Purple Top Tube   Result Value Ref Range    Hold Specimen JIC    CBC with platelets, differential    Narrative    The following orders were created for panel order CBC with platelets,  differential.  Procedure                               Abnormality         Status                     ---------                               -----------         ------                     CBC with platelets and d...[[id number]]                      Final result                 Please view results for these tests on the individual orders.   Comprehensive metabolic panel   Result Value Ref Range    Sodium 140 136 - 145 mmol/L    Potassium 3.9 3.4 - 5.3 mmol/L    Chloride 102 98 - 107 mmol/L    Carbon Dioxide (CO2) 22 22 - 29 mmol/L    Anion Gap 16 (H) 7 - 15 mmol/L    Urea Nitrogen 11.3 6.0 - 20.0 mg/dL    Creatinine 0.98 0.67 - 1.17 mg/dL    Calcium 9.9 8.6 - 10.0 mg/dL    Glucose 131 (H) 70 - 99 mg/dL    Alkaline Phosphatase 99 40 - 129 U/L    AST 39 0 - 45 U/L    ALT 43 0 - 70 U/L    Protein Total 8.5 (H) 6.4 - 8.3 g/dL    Albumin 4.9 3.5 - 5.2 g/dL    Bilirubin Total 0.8 <=1.2 mg/dL    GFR Estimate >90 >60 mL/min/1.73m2   Lipase   Result Value Ref Range    Lipase 29 13 - 60 U/L   Troponin T, High Sensitivity   Result Value Ref Range    Troponin T, High Sensitivity <6 <=22 ng/L   CBC with platelets and differential   Result Value Ref Range    WBC Count 10.8 4.0 - 11.0 10e3/uL    RBC Count 5.61 4.40 - 5.90 10e6/uL    Hemoglobin 15.9 13.3 - 17.7 g/dL    Hematocrit 46.8 40.0 - 53.0 %    MCV 83 78 - 100 fL    MCH 28.3 26.5 - 33.0 pg    MCHC 34.0 31.5 - 36.5 g/dL    RDW 13.2 10.0 - 15.0 %    Platelet Count 207 150 - 450 10e3/uL    % Neutrophils 70 %    % Lymphocytes 20 %    % Monocytes 7 %    % Eosinophils 2 %    % Basophils 1 %    % Immature Granulocytes 0 %    NRBCs per 100 WBC 0 <1 /100    Absolute Neutrophils 7.5 1.6 - 8.3 10e3/uL    Absolute Lymphocytes 2.2 0.8 - 5.3 10e3/uL    Absolute Monocytes 0.7 0.0 - 1.3 10e3/uL    Absolute Eosinophils 0.2 0.0 - 0.7 10e3/uL    Absolute Basophils 0.1 0.0 - 0.2 10e3/uL    Absolute Immature Granulocytes 0.0 <=0.4 10e3/uL    Absolute NRBCs 0.0 10e3/uL   CT Abdomen Pelvis w  Contrast    Narrative    EXAM: CT ABDOMEN PELVIS W CONTRAST  LOCATION: [facility]  DATE: 6/26/2023    INDICATION: Upper abdominal pain since Friday.  COMPARISON: None.  TECHNIQUE: CT scan of the abdomen and pelvis was performed following injection of IV contrast. Multiplanar reformats were obtained. Dose reduction techniques were used.  CONTRAST: 100 mL Isovue 370    FINDINGS:   LOWER CHEST: Normal.    HEPATOBILIARY: Gallstones seen impacted in the gallbladder neck with a distended gallbladder. No adjacent inflammatory change.    PANCREAS: Normal.    SPLEEN: Normal.    ADRENAL GLANDS: Normal.    KIDNEYS/BLADDER: Normal.    BOWEL: Normal.    LYMPH NODES: Normal.    VASCULATURE: Unremarkable.    PELVIC ORGANS: Normal.    MUSCULOSKELETAL: Normal.      Impression    IMPRESSION:   1.  Cholelithiasis with gallstones seen impacted in the gallbladder neck and distended gallbladder. Consider evaluation with ultrasound to exclude developing cholecystitis versus symptomatic cholelithiasis.  2.  No obstruction, colitis, diverticulitis, or appendicitis.  3.  No obstructing renal or ureteral stones. No hydroureteronephrosis.   Abdomen US, limited (RUQ only)    Narrative    EXAM: US ABDOMEN LIMITED  LOCATION: [facility]  DATE: 6/26/2023    INDICATION: ReSound recommended after CT scan findings  COMPARISON: 06/26/2023  TECHNIQUE: Limited abdominal ultrasound.    FINDINGS:    GALLBLADDER: Mobile sludge as well as an immobile stone is seen in the gallbladder neck measuring up to 2.7 cm.    BILE DUCTS: No biliary dilatation. The common duct measures 6 mm.    LIVER: Increased echogenicity from diffuse fatty infiltration. Hypoechogenic region in the right hepatic lobe measuring up to 2.8 cm could reflect focal fatty sparing.    RIGHT KIDNEY: No hydronephrosis.    PANCREAS: The pancreas is largely obscured by overlying gas.    No ascites.      Impression    IMPRESSION:  1.  Large  amount of sludge in the gallbladder with an impacted stone in the gallbladder neck measuring 2.7 cm. No convincing evidence of cholecystitis. However, a HIDA scan can be considered to evaluate for cystic duct obstruction.  2.  Hepatic steatosis. Hypoechogenic lesion in the right hepatic lobe measuring up to 2.8 cm could reflect focal fatty sparing.       Medications   piperacillin-tazobactam (ZOSYN) intermittent infusion 3.375 g (3.375 g Intravenous $New Bag 6/26/23 8666)   iopamidol (ISOVUE-370) solution 100 mL (100 mLs Intravenous $Given 6/26/23 1900)   sodium chloride 0.9 % bag 500 mL for CT scan flush use (67 mLs Intravenous $Given 6/26/23 1900)   ketorolac (TORADOL) injection 15 mg (15 mg Intravenous $Given 6/26/23 1832)   HYDROmorphone (PF) (DILAUDID) injection 0.3 mg (0.3 mg Intravenous $Given 6/26/23 1940)       Assessments & Plan (with Medical Decision Making) records were reviewed past medical history medications and allergies were reviewed.  E Ag was without significant abnormality no significant change from 1/10/2021..  Office visit from 5/22/2023's visit from 4/7/2022 were reviewed.  I independently reviewed and interpreted the labs.  Patient was initially given Toradol but then required Dilaudid for pain.  White count was 10.8 hemoglobin 15.9 platelet count 207.  Comprehensive metabolic panel without significant abnormality.  Lipase was within normal limits.  Troponin was less than 6.  Due to the patient's epigastric pain and right upper quadrant I did do a CT abdomen pelvis.  I independently reviewed and interpreted the findings and agree with the radiologist findings of cholelithiasis with gallstones seen impacted in the gallbladder neck and distended gallbladder.  No obstruction colitis diverticulitis or appendicitis present.  No obstructing renal or ureteral stones no hydroureteronephrosis.  Findings discussed with patient.  He was recommended an ultrasound to be done by radiology and therefore  this was ordered.  This revealed a large amount of sludge in the gallbladder with an impacted stone in the gallbladder neck measuring 2.7 cm no convincing evidence of cholecystitis.  I discussed the case with Dr. Pimentel who is on-call for general surgery and he felt it was warranted to take the patient's gallbladder out as he is continue to have some pain.  Patient is informed of this and is agreement the plan.  Dr. Pimentel wanted patient covered with Zosyn.  He is going to try to operate on the patient first thing in the morning.  I discussed the case with Dr. Novak with hospitalist service and he is agree with admitting the patient for further evaluation and care.     I have reviewed the nursing notes.    I have reviewed the findings, diagnosis, plan and need for follow up with the patient.                 Final diagnoses:   Symptomatic cholelithiasis       6/26/2023   [facility] EMERGENCY DEPT     Jeovany Liu MD  06/28/23 [id number]

## 2023-06-27 NOTE — PROGRESS NOTES
[facility] DISCHARGE NOTE    Patient discharged to home at 5:31 PM via wheel chair. Accompanied by mother and staff. Discharge instructions reviewed with patient, opportunity offered to ask questions. Prescriptions filled and sent with patient upon discharge. All belongings sent with patient.    Carlo Kyle RN

## 2023-06-27 NOTE — ANESTHESIA CARE TRANSFER NOTE
Patient: Mazin Herbert    Procedure: Procedure(s):  CHOLECYSTECTOMY, LAPAROSCOPIC       Diagnosis: Chronic RUQ pain [R10.11, G89.29]  Gallstones [K80.20]  Diagnosis Additional Information: No value filed.    Anesthesia Type:   General     Note:    Oropharynx: spontaneously breathing and oral airway in place  Level of Consciousness: drowsy  Oxygen Supplementation: room air    Independent Airway: airway patency satisfactory and stable  Dentition: dentition unchanged  Vital Signs Stable: post-procedure vital signs reviewed and stable  Report to RN Given: handoff report given  Patient transferred to: PACU    Handoff Report: Identifed the Patient, Identified the Reponsible Provider, Reviewed the pertinent medical history, Discussed the surgical course, Reviewed Intra-OP anesthesia mangement and issues during anesthesia, Set expectations for post-procedure period and Allowed opportunity for questions and acknowledgement of understanding      Vitals:  Vitals Value Taken Time   BP     Temp     Pulse     Resp     SpO2         Electronically Signed By: SWATHI Hopkins CRNA  June 27, 2023  7:13 AM

## 2023-06-27 NOTE — ED NOTES
"[facility]   Admission Handoff    The patient is Mazin Herbert, 52 year old who arrived in the ED by CAR from emergency room with a complaint of Abdominal Pain (Upper abdominal pain constant for the past 2.5 days, initially pt said chest pain but is pointing to the upper abdomen. ) and Chest Pain  . The patient's current symptoms are new and during this time the symptoms have increased. In the ED, patient was diagnosed with   Final diagnoses:   Symptomatic cholelithiasis         Needed?: No    Allergies:  No Known Allergies    Past Medical Hx:   Past Medical History:   Diagnosis Date     Depressive disorder        Initial vitals were: BP: 133/85  Pulse: 116  Temp: 98.6  F (37  C)  Resp: 20  Height: 177.8 cm (5' 10\")  Weight: 99.8 kg (220 lb)  SpO2: 99 %   Recent vital Signs: BP (!) 147/100   Pulse 67   Temp 98.6  F (37  C) (Tympanic)   Resp 11   Ht 1.778 m (5' 10\")   Wt 99.8 kg (220 lb)   SpO2 95%   BMI 31.57 kg/m      Elimination Status: Continent: Yes     Activity Level: Independent    Fall Status: Reason for falls risk:  Mobility  nonskid shoes/slippers when out of bed    Baseline Mental status: WDL  Current Mental Status changes: at basesline    Infection present or suspected this encounter: No  Sepsis suspected: No    Isolation type: None    Bariatric equipment needed?: No    In the ED these meds were given:   Medications   piperacillin-tazobactam (ZOSYN) intermittent infusion 3.375 g (3.375 g Intravenous $New Bag 6/26/23 2246)   iopamidol (ISOVUE-370) solution 100 mL (100 mLs Intravenous $Given 6/26/23 1900)   sodium chloride 0.9 % bag 500 mL for CT scan flush use (67 mLs Intravenous $Given 6/26/23 1900)   ketorolac (TORADOL) injection 15 mg (15 mg Intravenous $Given 6/26/23 1832)   HYDROmorphone (PF) (DILAUDID) injection 0.3 mg (0.3 mg Intravenous $Given 6/26/23 1940)       Drips running?  No    Home pump  No    Current LDAs: Peripheral IV: Right AC; " Gauge 18g  none     Results:   Labs/Imaging  Ordered and Resulted from Time of ED Arrival Up to the Time of Departure from the ED  Results for orders placed or performed during the hospital encounter of 06/26/23 (from the past 24 hour(s))   [place] Draw    Narrative    The following orders were created for panel order [place] Draw.  Procedure                               Abnormality         Status                     ---------                               -----------         ------                     Extra Blue Top Tube[[id number]]                              Final result               Extra Red Top Tube[[id number]]                               Final result               Extra Green Top (Lithium...[[id number]]                      Final result               Extra Purple Top Tube[[id number]]                            Final result                 Please view results for these tests on the individual orders.   Extra Blue Top Tube   Result Value Ref Range    Hold Specimen JIC    Extra Red Top Tube   Result Value Ref Range    Hold Specimen JIC    Extra Green Top (Lithium Heparin) Tube   Result Value Ref Range    Hold Specimen jic    Extra Purple Top Tube   Result Value Ref Range    Hold Specimen JIC    CBC with platelets, differential    Narrative    The following orders were created for panel order CBC with platelets, differential.  Procedure                               Abnormality         Status                     ---------                               -----------         ------                     CBC with platelets and d...[[id number]]                      Final result                 Please view results for these tests on the individual orders.   Comprehensive metabolic panel   Result Value Ref Range    Sodium 140 136 - 145 mmol/L    Potassium 3.9 3.4 - 5.3 mmol/L    Chloride 102 98 - 107 mmol/L    Carbon Dioxide (CO2) 22 22 - 29 mmol/L    Anion Gap 16 (H) 7 - 15 mmol/L    Urea Nitrogen 11.3 6.0 - 20.0 mg/dL     Creatinine 0.98 0.67 - 1.17 mg/dL    Calcium 9.9 8.6 - 10.0 mg/dL    Glucose 131 (H) 70 - 99 mg/dL    Alkaline Phosphatase 99 40 - 129 U/L    AST 39 0 - 45 U/L    ALT 43 0 - 70 U/L    Protein Total 8.5 (H) 6.4 - 8.3 g/dL    Albumin 4.9 3.5 - 5.2 g/dL    Bilirubin Total 0.8 <=1.2 mg/dL    GFR Estimate >90 >60 mL/min/1.73m2   Lipase   Result Value Ref Range    Lipase 29 13 - 60 U/L   Troponin T, High Sensitivity   Result Value Ref Range    Troponin T, High Sensitivity <6 <=22 ng/L   CBC with platelets and differential   Result Value Ref Range    WBC Count 10.8 4.0 - 11.0 10e3/uL    RBC Count 5.61 4.40 - 5.90 10e6/uL    Hemoglobin 15.9 13.3 - 17.7 g/dL    Hematocrit 46.8 40.0 - 53.0 %    MCV 83 78 - 100 fL    MCH 28.3 26.5 - 33.0 pg    MCHC 34.0 31.5 - 36.5 g/dL    RDW 13.2 10.0 - 15.0 %    Platelet Count 207 150 - 450 10e3/uL    % Neutrophils 70 %    % Lymphocytes 20 %    % Monocytes 7 %    % Eosinophils 2 %    % Basophils 1 %    % Immature Granulocytes 0 %    NRBCs per 100 WBC 0 <1 /100    Absolute Neutrophils 7.5 1.6 - 8.3 10e3/uL    Absolute Lymphocytes 2.2 0.8 - 5.3 10e3/uL    Absolute Monocytes 0.7 0.0 - 1.3 10e3/uL    Absolute Eosinophils 0.2 0.0 - 0.7 10e3/uL    Absolute Basophils 0.1 0.0 - 0.2 10e3/uL    Absolute Immature Granulocytes 0.0 <=0.4 10e3/uL    Absolute NRBCs 0.0 10e3/uL   CT Abdomen Pelvis w Contrast    Narrative    EXAM: CT ABDOMEN PELVIS W CONTRAST  LOCATION: [facility]  DATE: 6/26/2023    INDICATION: Upper abdominal pain since Friday.  COMPARISON: None.  TECHNIQUE: CT scan of the abdomen and pelvis was performed following injection of IV contrast. Multiplanar reformats were obtained. Dose reduction techniques were used.  CONTRAST: 100 mL Isovue 370    FINDINGS:   LOWER CHEST: Normal.    HEPATOBILIARY: Gallstones seen impacted in the gallbladder neck with a distended gallbladder. No adjacent inflammatory change.    PANCREAS: Normal.    SPLEEN: Normal.    ADRENAL  GLANDS: Normal.    KIDNEYS/BLADDER: Normal.    BOWEL: Normal.    LYMPH NODES: Normal.    VASCULATURE: Unremarkable.    PELVIC ORGANS: Normal.    MUSCULOSKELETAL: Normal.      Impression    IMPRESSION:   1.  Cholelithiasis with gallstones seen impacted in the gallbladder neck and distended gallbladder. Consider evaluation with ultrasound to exclude developing cholecystitis versus symptomatic cholelithiasis.  2.  No obstruction, colitis, diverticulitis, or appendicitis.  3.  No obstructing renal or ureteral stones. No hydroureteronephrosis.   Abdomen US, limited (RUQ only)    Narrative    EXAM: US ABDOMEN LIMITED  LOCATION: [facility]  DATE: 6/26/2023    INDICATION: ReSound recommended after CT scan findings  COMPARISON: 06/26/2023  TECHNIQUE: Limited abdominal ultrasound.    FINDINGS:    GALLBLADDER: Mobile sludge as well as an immobile stone is seen in the gallbladder neck measuring up to 2.7 cm.    BILE DUCTS: No biliary dilatation. The common duct measures 6 mm.    LIVER: Increased echogenicity from diffuse fatty infiltration. Hypoechogenic region in the right hepatic lobe measuring up to 2.8 cm could reflect focal fatty sparing.    RIGHT KIDNEY: No hydronephrosis.    PANCREAS: The pancreas is largely obscured by overlying gas.    No ascites.      Impression    IMPRESSION:  1.  Large amount of sludge in the gallbladder with an impacted stone in the gallbladder neck measuring 2.7 cm. No convincing evidence of cholecystitis. However, a HIDA scan can be considered to evaluate for cystic duct obstruction.  2.  Hepatic steatosis. Hypoechogenic lesion in the right hepatic lobe measuring up to 2.8 cm could reflect focal fatty sparing.       For the majority of the shift this patient's behavior was Green     Cardiac Rhythm: Normal Sinus  Pt needs tele? No  Skin/wound Issues: None    Code Status: Assess during admission    Pain control: fair    Nausea control: pt had none    Abnormal  labs/tests/findings requiring intervention: Surg    Patient tested for COVID 19 prior to admission: NO     OBS brochure/video discussed/provided to patient/family: Yes     Family present during ED course? Yes     Family Comments/Social Situation comments: Family present    Tasks needing completion: None    Kacey Garcia RN

## 2023-06-27 NOTE — ANESTHESIA POSTPROCEDURE EVALUATION
Patient: Mazin Herbert    Procedure: Procedure(s):  CHOLECYSTECTOMY, LAPAROSCOPIC       Anesthesia Type:  General    Note:  Disposition: Outpatient   Postop Pain Control: Uneventful            Sign Out: Well controlled pain   PONV: No   Neuro/Psych: Uneventful            Sign Out: Acceptable/Baseline neuro status   Airway/Respiratory: Uneventful            Sign Out: Acceptable/Baseline resp. status   CV/Hemodynamics: Uneventful            Sign Out: Acceptable CV status; No obvious hypovolemia; No obvious fluid overload   Other NRE: NONE   DID A NON-ROUTINE EVENT OCCUR? No           Last vitals:  Vitals Value Taken Time   /87 06/27/23 0830   Temp 36.5  C (97.7  F) 06/27/23 0830   Pulse 67 06/27/23 0840   Resp 11 06/27/23 0840   SpO2 98 % 06/27/23 0840   Vitals shown include unvalidated device data.    Electronically Signed By: Pradip Mortensen CRNA, APRN CRNA  June 27, 2023  12:41 PM

## 2023-06-27 NOTE — PROGRESS NOTES
"[facility] [facility] ADMISSION NOTE    Patient admitted to room 2315 at approximately 0015 via wheel chair from emergency room. Patient was accompanied by mother.     Verbal SBAR report received from Kacey RICO prior to patient arrival.     Patient ambulated to bed independently. Patient alert and oriented X 4. Pain is controlled with current analgesics.  Medication(s) being used: narcotic analgesics including Dilaudid 0.5 mg. Admission vital signs: Blood pressure 128/82, pulse 69, temperature 97.6  F (36.4  C), temperature source Oral, resp. rate 16, height 1.778 m (5' 10\"), weight 99.8 kg (220 lb), SpO2 96 %. Patient was oriented to plan of care, call light, bed controls, tv, telephone, bathroom and visiting hours.     Risk Assessment    The following safety risks were identified during admission: none. Yellow risk band applied: NO.     Skin Initial Assessment    This writer admitted this patient and he Declined a full skin assessment. Appropriate interventions initiated as needed.    Secondary skin check completed by Declined.    Dennis Risk Assessment  Sensory Perception: 4-->no impairment  Moisture: 4-->rarely moist  Activity: 4-->walks frequently  Mobility: 4-->no limitation  Nutrition: 4-->excellent  Friction and Shear: 3-->no apparent problem  Dennis Score: 23  Mattress: Standard gel/foam mattress (IsoFlex, Atmos Air, etc.)  Bed Frame: Standard width and length    Education    Patient has a [place] to Observation order: Yes  Observation education completed and documented: Yes      Chani Campos RN    " Detail Level: Detailed Consent: The patient's consent was obtained including but not limited to risks of crusting, scabbing, blistering, scarring, darker or lighter pigmentary change, recurrence, incomplete removal and infection. Anesthesia Volume In Cc: 0.5 Anesthesia Type: 1% Xylocaine with [id number] epinephrine and sodium bicarbonate Post-Care Instructions: I reviewed with the patient in detail post-care instructions. Patient is to wear sunprotection, and avoid picking at any of the treated lesions. Pt may apply Vaseline to crusted or scabbing areas. Price (Use Numbers Only, No Special Characters Or $): 125.00

## 2023-06-27 NOTE — OR NURSING
To pacu. freq coughing. Oral airway removed. Less coughing. Denies pain. Dozes. C/o headache. Moans and reoriented. abd sites d/i. Ice to abd. abd binder on and loosened postop. scd on bilat. Doing well. Will cont to reassess. Drowsy. Iv patent.

## 2023-06-27 NOTE — DISCHARGE INSTRUCTIONS
LAPAROSCOPIC CHOLECYSTECTOMY DISCHARGE INSTRUCTIONS  DR. POWER HUNT    Please call [phone], for  [facility] or  for [facility], to schedule a follow up appointment in 2 weeks.       1. You may resume your regular diet when you feel you are ready to. DO NOT drink alcoholic beverages for 24 hours of while you are taking prescription medication.    2. Limit your activities for the first 48 hours. Gradually, increase them as tolerated. You may use stairs. I encourage you to walk as tolerated. No lifting greater that 20 pounds for 4 weeks.    3. You will have some discomfort at the incision sites. This is expected. This should improve over the next 2-3 days. Ice and pain medication will help with this pain. Use prescribed pain medication as instructed.    4. Bruising and mild swelling is normal after surgery. The area below and around the incision(s) will be hard and elevated. This is normal. I call it the healing ridge. This will resolve slowly over the next several months. If you feel the pain is increasing and cannot explain it by increasing activity please call us at [phone].    5. The dressing will often have some blood on it. You may shower 24 hours after surgery. No baths for 2 weeks after surgery.  Clean gently over incision site. If clear plastic covering or steri-strip comes off and there is still some bleeding or drainage then cover with gauze or band-aid. If there is no bleeding, there is no reason to cover site. The abdominal binder may be removed after 24 hours after surgery. You may continue to wear it however for comfort. I suggest  you wear an old t-shirt under the abdominal binder for a more comfortable wear.    6. Avoid Aspirin for the first 72 hours after the procedure. This medication may increase the tendency to bleed.    7. Use the following medications (in addition to your normal meds) as shown:  a. Percocet 5 mg 1-2 every 6 hours as needed  for pain. This contains 500 mg of Tylenol (acetaminophen) per tablet. Please do not take more than 4 grams of Tylenol (acetaminophen) per day. For example, you may take 1 Percocet and 1 Tylenol, or 2 Percocet and no Tylenol, or 2 Tylenol and no Percocet every 6 hours.  b. Tylenol (acetaminophen) 500 mg every 6 hours as needed for pain. Do not take more than 1000 mg every 6 hours (see above).  c. Motrin (ibuprofen) 200-800 mg every 6 hours as needed for pain. Take with food.    8. Notify Dr. Oro's clinic at [phone], for  [facility] or  for [facility] clinic:   if:  Your discomfort is not relieved by your pain medication.  You have signs of infection such as temperature above 100.5 degrees orally, chills, or increasing daily discomfort.  Incision site is becoming more red and/or there is purulent drainage.  You have questions or concerns.    9.  Please call [phone], for  [place] clinic or  for [facility] clinic, to schedule a follow up appointment in 2 weeks.       10. When taking narcotics (pain medication more than Tylenol [acetaminophen] and Motrin [ibuprofen]) it is important to keep your stools soft to avoid constipation and pain with straining. This is best done by drinking fluids (non-alcoholic and non-caffeinated) and taking a stool softener (i.e. Metamucil or milk of magnesia). You may be able to use non-narcotics for pain relief especially by the 3rd post- operative day. Tylenol 500 mg  every 6 hours and/or Motrin (ibuprofen) 200-800 mg every 6 hours. Please do not take more than 4 grams of Tylenol (acetaminophen) per day. Remember your Percocet does have Tylenol (acetaminophen) already in it. Please take Motrin (ibuprofen) with food to help protect the stomach. If you have a history of stomach ulcers or stomach problems, do not take Motrin (ibuprofen).    11. Do not drive or operate heavy machinery for 24 hours after surgery or when  taking narcotics. You may resume driving when feel that you can safely avoid an accident and are not taking narcotics. This is usually 5 to 7 days after surgery. You should not be alone for 24 hours after surgery.    12. Bile is important for breaking down fatty foods. Excessive fatty foods may cause diarrhea. Please be aware of this when you first start eating fatty foods.    13. Have milk of magnesia available at home so that when you take the pain medications you take 1-2 teaspoons a day,  to help reduce problems with constipation.      14. If you have questions after your procedure/surgery please contact Dr Oro's primary clinic in [place]. You can call [phone], your other option is to send us a Gecko message through your [employer] portal to Dr Oro's team. For urgent and non urgent matters these options are best. If your symptoms are emergent or cannot wait, please proceed to [facility] and let them know who you had surgery with.       Patient can receive pain medications that I have ordered and give the first dose in the recovery room as needed.

## 2023-06-27 NOTE — ANESTHESIA PREPROCEDURE EVALUATION
Anesthesia Pre-Procedure Evaluation    Patient: Mazin Herbert   MRN: [id number] : 1970        Procedure : Procedure(s):  CHOLECYSTECTOMY, LAPAROSCOPIC          Past Medical History:   Diagnosis Date     Depressive disorder       Past Surgical History:   Procedure Laterality Date     TONSILLECTOMY        No Known Allergies   Social History     Tobacco Use     Smoking status: Never     Smokeless tobacco: Never   Substance Use Topics     Alcohol use: No     Comment: none      Wt Readings from Last 1 Encounters:   23 95.6 kg (210 lb 12.2 oz)        Anesthesia Evaluation            ROS/MED HX  ENT/Pulmonary:       Neurologic:       Cardiovascular:       METS/Exercise Tolerance:     Hematologic:       Musculoskeletal:       GI/Hepatic:       Renal/Genitourinary:       Endo:     (+) Obesity,     Psychiatric/Substance Use:     (+) psychiatric history anxiety and depression     Infectious Disease:       Malignancy:       Other:            Physical Exam    Airway        Mallampati: II   TM distance: > 3 FB   Neck ROM: full   Mouth opening: > 3 cm    Respiratory Devices and Support  Comment: Not on oxygen currently       Dental           Cardiovascular   cardiovascular exam normal          Pulmonary   pulmonary exam normal                OUTSIDE LABS:  CBC:   Lab Results   Component Value Date    WBC 10.8 2023    WBC 10.2 2013    HGB 15.9 2023    HGB 16.5 2013    HCT 46.8 2023    HCT 48.4 2013     2023     2013     BMP:   Lab Results   Component Value Date     2023     2013    POTASSIUM 3.9 2023    POTASSIUM 4.2 2013    CHLORIDE 102 2023    CHLORIDE 99 2013    CO2 22 2023    CO2 23 2013    BUN 11.3 2023    BUN 14 2013    CR 0.98 2023    CR 0.96 2013     (H) 2023     (H) 2013     COAGS: No results found for: PTT, INR, FIBR  POC: No  results found for: BGM, HCG, HCGS  HEPATIC:   Lab Results   Component Value Date    ALBUMIN 4.9 06/26/2023    PROTTOTAL 8.5 (H) 06/26/2023    ALT 43 06/26/2023    AST 39 06/26/2023    ALKPHOS 99 06/26/2023    BILITOTAL 0.8 06/26/2023     OTHER:   Lab Results   Component Value Date    JONATHON 9.9 06/26/2023    LIPASE 29 06/26/2023    AMYLASE 70 03/02/2010    TSH Patient refused collection 11/09/2010       Anesthesia Plan    ASA Status:  3      Anesthesia Type: General.     - Airway: ETT   Induction: Intravenous, Propofol.   Maintenance: Balanced.        Consents    Anesthesia Plan(s) and associated risks, benefits, and realistic alternatives discussed. Questions answered and patient/representative(s) expressed understanding.     - Discussed: Risks, Benefits and Alternatives for BOTH SEDATION and the PROCEDURE were discussed     - Discussed with:  Patient         Postoperative Care    Pain management: IV analgesics, Oral pain medications, Multi-modal analgesia.   PONV prophylaxis: Ondansetron (or other 5HT-3), Dexamethasone or Solumedrol     Comments:    Other Comments: Patient aware of plan, what to expect, and potential risks. Timeout was performed before bringing the patient back to OR, and once again, patient was asked if they had any questions            SWATHI Larson CRNA

## 2023-06-27 NOTE — ANESTHESIA PROCEDURE NOTES
Airway         Procedure Start/Stop Times: 6/27/2023 5:35 AM and 6/27/2023 5:36 AM  Staff -        CRNA: Josse Diop APRN CRNA       Performed By: CRNA  Consent for Airway        Urgency: emergent  Indications and Patient Condition       Indications for airway management: airway protection       Induction type:RSI       Mask difficulty assessment: 0 - not attempted    Final Airway Details       Final airway type: endotracheal airway       Successful airway: ETT - single  Endotracheal Airway Details        ETT size (mm): 7.5       Cuffed: yes       Successful intubation technique: video laryngoscopy       VL Blade Size: Hess 4       Grade View of Cords: 1       Adjucts: stylet       Position: Center       Measured from: gums/teeth       Secured at (cm): 23       Bite block used: None    Post intubation assessment        Placement verified by: capnometry        Number of attempts at approach: 1       Secured with: plastic tape       Ease of procedure: easy       Dentition: Intact    Medication(s) Administered   Medication Administration Time: 6/27/2023 5:35 AM    Additional Comments       RSI for airway protection, no bile or food noted, airway secured without issue

## 2023-06-27 NOTE — PROGRESS NOTES
"Time: 9540-4888       Reason for Admission: Symptomatic cholelithiasis       Activity: Independent       Neuro: WNL       GI/: WNL        Skin: Lap sites unchanged       Diet: Tolerating regular diet       Lines/Drains: PIV-saline locked       Vitals: /71 (BP Location: Left arm)   Pulse 73   Temp 98.2  F (36.8  C) (Oral)   Resp 18   Ht 1.803 m (5' 11\")   Wt 95.6 kg (210 lb 12.2 oz)   SpO2 94%   BMI 29.40 kg/m         Pain: Managed per MAR       Plan: Discharge home        PRN Meds: Oxycodone                         "

## 2023-06-27 NOTE — OP NOTE
OPERATIVE REPORT    June 27, 2023  Preoperative diagnosis: inflamed  gallbladder with stones and  right upper quadrant pain  Postoperative diagnosis:  same with stone impacted in neck of the gallbladder with edema and some scaring consistant with having had attacks in the past.   Has 2 early bilateral inguinal hernia   Procedure:   laparoscopic cholecystectomy   Anesthesia:  General anesthesia  Specimen: Gallbladder sent along with fluid for cultures and gram stains.   And 0.25 % marcaine with epinephrine injected into the surrounding tissue.   Blood loss: Minimal 15 cc  Surgeon : Leonel Oor M.D.  Indications:  Mazin Herbert is a 52 year old year old male with  gallbladder with stones and  right upper quadrant pain.   Discussed risks, complications including bleeding, infection, damage to bowel, damage to bile ducts, possible conversion to an open procedure, and this may not take care of all of her problems.  We also discussed the risks of anesthesia, hernias, damage to bowel and  bleeding that we cannot control laparoscopically, hole in the bowel, if the anatomy of the bile ducts is not correct may have to convert to open procedure.  She decided she would like to proceed.    Questions were answered and postoperative instructions were given to patient and any one with the patient.          DESCRIPTION OF PROCEDURE:  The patient was brought to the OR, placed in supine position.  After general anesthesia, the abdomen was prepped and draped in sterile manner.  A time out was done.   An incision was made infraumbilical, after infiltrating the area with local and a 5 mm trocar was placed with the camera going thru it and under direct visualization entered the abdomen without difficulty.  The abdomen was insufflated, 15  mmHg.  A 10 mm trocar was placed in the epigastric area, and 2 more 5 mm trocars were placed in the right upper quadrant in such a way to facilitate dissection.  All areas were  looked at carefully.  There was no evidence of any bleeding or damage to bowel entering the abdomen.  The gallbladder was grasped. And aspirated about 30 ml of fluid that was bile like but no pus noted.  The cystic duct was carefully dissected out and clipped x 2 proximally and distally and divided.  Cystic artery was carefully dissected out, clipped x 2 proximally and distally and divided.   Several other small vessels that were going into the gallbladder were also clipped x 2 proximally and distally and divided.  Then blunt, sharp cautery dissection to dissect the gallbladder off the gallbladder fossa.  Good hemostasis was achieved before removing the gallbladder completely.  It was then placed in an EndoCatch and removed through the 10/12 trocar site with having to dialate and open more to get the large gallbladder and stone out. .  Before placing the clips it was proven that the duct or blood vessel was going into the gallbladder.       The area was irrigated.   No evidence of any bleeding was seen.  We looked at this carefully, still did not see any bleeding.  We washed the area up, closed the 10/12 trocar fascia using the Hammad-Shyann equipment with an 0 Vicryl suture x  1.   We looked back, still could not see any bleeding, so we decided that the bleeding was stopped and as much CO2 was removed as possible from the trocar sites before we removed the trocars.  The 10/12 trocar subcutaneous fat was brought together with a couple of interrupted 3-0 Vicryl sutures, and the skin was closed at all sites more than 2.5 mm with 4-0 Monocryl, covered with tincture of Benzoin, Steri-Strips, and Tegaderm along with an abdominal binder.  The patient tolerated the procedure well, did receive antibiotics preoperatively.       Plan is to discharge the patient home today, lifting no more than 20 pounds for 3 weeks.  See the patient back in approximately 2 weeks.   Surgical count was correct.   Patient did receive  antibiotics before starting the procedure.           POWER HUNT MD

## 2023-06-27 NOTE — DISCHARGE SUMMARY
[facility]  Discharge Summary  Hospital Medicine       Date of Admission:  6/26/2023  Date of Discharge:  6/27/2023  5:50 PM  Discharging Provider: Michael Pretty MD      Identification and Chief Compaint: Mazin Herbert is a 52 year old male who presented on 6/26/2023 with complaint of 4 days of primarily right upper quadrant abdominal pain.    Discharge Diagnoses   Acute cholecystitis  S/p lap cholecystectomy 6/27/2023     Follow-ups Needed After Discharge   Follow-up Appointments     Follow-up and recommended labs and tests       Follow up with Dr. Oro , at [facility], per his   recommendation.            Unresulted Labs Ordered in the Past 30 Days of this Admission     Date and Time Order Name Status Description    6/27/2023  6:26 AM Surgical Pathology Exam In process     6/27/2023  6:12 AM Body fluid, unsp Aerobic Bacterial Culture Routine In process     6/27/2023  6:12 AM Anaerobic Bacterial Culture Routine In process       These results will be followed up by Dr. Oro.    Hospital Course      Acute cholecystitis  S/p lap cholecystectomy 6/27/2023    CT showed cholelithiasis with gallstones seen impacted in the gallbladder neck and distended gallbladder, US similar.  No obvious cholecystitis on admission, but patient started on piperacillin-tazobactam per surgery's request.    Patient underwent lap katherine this morning, 6/27, and intraoperative findings of acute cholecystitis. Patient is recovering well and ready for discharge this late afternoon. Post op cares per surgery.      Moderate episode of recurrent major depressive disorder (H)  Generalized anxiety disorder    Not on any medications.  Mood and affect stable on admission. Follow up with PCP as needed.      History of Migraine with aura and without status migrainosus, not intractable    Doing well, held home imitrex for now      Class 1 obesity without serious comorbidity in adult,  unspecified BMI, unspecified obesity type     Follow up with PCP as part of general screening and risk factor modification.     Consultations This Hospital Stay   SURGERY GENERAL IP CONSULT    Code Status   Full Code    The discharge plan was discussed with the patient, and he expressed understanding.     Time Spent on this Encounter   Total time on this discharge was 25 minutes.       Michael Pretty MD  [facility]  ______________________________________________________________________    Physical Exam   Vital Signs: Temp: 98.2  F (36.8  C) Temp src: Oral BP: 109/71 Pulse: 73   Resp: 18 SpO2: 94 % O2 Device: None (Room air) Oxygen Delivery: 2 LPM  Weight: 210 lbs 12.16 oz  Constitutional: alert and oriented, appears a little uncomfortable otherwise NAD and nontoxic  CV: Regular  Respiratory: CTA bilaterally  GI: Soft, tender at surgical sites.   Skin: Warm and dry       Primary Care Physician   Karen Marquez  [address] [place] [id number]     Discharge Disposition   Discharged to home  Condition at discharge: Stable    Significant Results and Procedures   Results for orders placed or performed during the hospital encounter of 06/26/23   CT Abdomen Pelvis w Contrast    Narrative    EXAM: CT ABDOMEN PELVIS W CONTRAST  LOCATION: [facility]  DATE: 6/26/2023    INDICATION: Upper abdominal pain since Friday.  COMPARISON: None.  TECHNIQUE: CT scan of the abdomen and pelvis was performed following injection of IV contrast. Multiplanar reformats were obtained. Dose reduction techniques were used.  CONTRAST: 100 mL Isovue 370    FINDINGS:   LOWER CHEST: Normal.    HEPATOBILIARY: Gallstones seen impacted in the gallbladder neck with a distended gallbladder. No adjacent inflammatory change.    PANCREAS: Normal.    SPLEEN: Normal.    ADRENAL GLANDS: Normal.    KIDNEYS/BLADDER: Normal.    BOWEL: Normal.    LYMPH NODES: Normal.    VASCULATURE:  Unremarkable.    PELVIC ORGANS: Normal.    MUSCULOSKELETAL: Normal.      Impression    IMPRESSION:   1.  Cholelithiasis with gallstones seen impacted in the gallbladder neck and distended gallbladder. Consider evaluation with ultrasound to exclude developing cholecystitis versus symptomatic cholelithiasis.  2.  No obstruction, colitis, diverticulitis, or appendicitis.  3.  No obstructing renal or ureteral stones. No hydroureteronephrosis.   Abdomen US, limited (RUQ only)    Narrative    EXAM: US ABDOMEN LIMITED  LOCATION: [facility]  DATE: 6/26/2023    INDICATION: ReSound recommended after CT scan findings  COMPARISON: 06/26/2023  TECHNIQUE: Limited abdominal ultrasound.    FINDINGS:    GALLBLADDER: Mobile sludge as well as an immobile stone is seen in the gallbladder neck measuring up to 2.7 cm.    BILE DUCTS: No biliary dilatation. The common duct measures 6 mm.    LIVER: Increased echogenicity from diffuse fatty infiltration. Hypoechogenic region in the right hepatic lobe measuring up to 2.8 cm could reflect focal fatty sparing.    RIGHT KIDNEY: No hydronephrosis.    PANCREAS: The pancreas is largely obscured by overlying gas.    No ascites.      Impression    IMPRESSION:  1.  Large amount of sludge in the gallbladder with an impacted stone in the gallbladder neck measuring 2.7 cm. No convincing evidence of cholecystitis. However, a HIDA scan can be considered to evaluate for cystic duct obstruction.  2.  Hepatic steatosis. Hypoechogenic lesion in the right hepatic lobe measuring up to 2.8 cm could reflect focal fatty sparing.     Procedures    Lap cholecystectomy extension of one site to accommodate removing large GB    Discharge Orders      NO lifting    NO lifting over  20  lbs and no strenuous physical activity for  4  weeks.     Discharge Instructions - diet    Resume pre procedure diet.     NO driving or operating machinery     until the day after the procedure.     NO ALCOHOL      For 24 hours post procedure.     Shower    No shower for 24 hours post procedure. May shower on post-op day  1     Do not immerse incision in water     until the sutures are removed.     Dressing    Keep dressing clean and dry.  Dressing / incision care as instructed by Provider and/or Nurse.     Ice to affected area    Ice to operative site, as needed.     Discharge Instructions    Follow up appointment as instructed by Provider and/or Nurse.     Reason for your hospital stay    Cholecystitis, acute, now post cholecystectomy     Follow-up and recommended labs and tests     Follow up with Dr. Oro , at [facility], per his recommendation.     Discharge Medications   Current Discharge Medication List      START taking these medications    Details   ibuprofen (ADVIL/MOTRIN) 200 MG tablet Take 1 tablet (200 mg) by mouth every 6 hours as needed for other (mild pain)  Qty: 30 tablet, Refills: 0    Associated Diagnoses: Chronic RUQ pain; Gallstones; Symptomatic cholelithiasis      oxyCODONE (ROXICODONE) 5 MG tablet Take 1 tablet (5 mg) by mouth every 4 hours as needed for pain  Qty: 16 tablet, Refills: 0    Associated Diagnoses: Chronic RUQ pain; Gallstones; Symptomatic cholelithiasis      polyethylene glycol (MIRALAX) 17 g packet Take 17 g by mouth daily as needed for constipation    Associated Diagnoses: Drug-induced constipation      sennosides (SENOKOT) 8.6 MG tablet Take 1-2 tablets by mouth 2 times daily as needed for constipation    Associated Diagnoses: Drug-induced constipation         CONTINUE these medications which have NOT CHANGED    Details   SUMAtriptan (IMITREX) 100 MG tablet Take 1 tablet (100 mg) by mouth at onset of headache for migraine May repeat in 2 hours if needed: max 2/day.  Qty: 12 tablet, Refills: 11    Associated Diagnoses: Migraine with aura and without status migrainosus, not intractable           Allergies   No Known Allergies

## 2023-06-27 NOTE — INTERVAL H&P NOTE
"I have reviewed the surgical (or preoperative) H&P that is linked to this encounter, and examined the patient. There are no significant changes    Clinical Conditions Present on Arrival:  Clinically Significant Risk Factors Present on Admission                  # Overweight: Estimated body mass index is 29.4 kg/m  as calculated from the following:    Height as of this encounter: 1.803 m (5' 11\").    Weight as of this encounter: 95.6 kg (210 lb 12.2 oz).       "

## 2023-06-27 NOTE — H&P
[facility] History and Physical    Mazin Herbert MRN# [id number]   Age: 52 year old YOB: 1970     Date of Admission:  6/26/2023      Primary care provider: Karen Marquez          Assessment and Plan:   Assessment & Plan         Symptomatic cholelithiasis with right upper quadrant pain due to this   - CT showed cholelithiasis with gallstones seen impacted in the gallbladder neck and distended gallbladder, US similar.  No obvious cholecystitis, but patient started on zosyn per surgery's request.  - labs normal.    - pain present past 4 days prior to admission, up to 9/10.    - has had prior flares in years past.      - continue dilaudid/oxycodone/tylenol prn for pain  - patient NPO on admission for planned surgery AM 6/27.    - hope for discharge pm 6/27 if doing well.     Surgery Clearance and RCRI Risk Assessment:   Anesthesia issues: no  Baseline Activity: >4 METS (1 self-care, 4 flight of stairs, >4 heavy house work)  Chest Pain: no  Shortness of breath: no  Cardiac Risk Factors/Assessment:                High Risk Surgery: no (Ex: vascular, open intraperitoneal, intrathoracic)              History Ischemic Heart Disease: no (MI, +stress, nitrate use, current CP thought to be ischemia, ECG with pathological Qs)              History of Congestive Heart Failure: no              History of CVA: no              Preoperative Treatment with Insulin: no              Preoperative Creatinine greater than 2.0: no              Total Number of Points: 0 = 0.5% risk of major cardiac event  0 = 0.5; 1 = 1.3%; 2 = 3.6%; 3+ = 9.1%.  Ok for surgery 6/27, no modifiable risk factors noted.           Moderate episode of recurrent major depressive disorder (H)    Generalized anxiety disorder  Not on any medications.  Mood and affect stable on admission.       History of Migraine with aura and without status migrainosus, not intractable  Doing well, held home imitrex for now         Class 1  "obesity without serious comorbidity in adult, unspecified BMI, unspecified obesity type  Complicates surgery           Prophylaxis  Low risk, reassess if unable to discharge 6/27    Lines  PIV    Diet  Npo except medications given pan for surgery in the AM.     Clinically Significant Risk Factors Present on Admission                       # Obesity: Estimated body mass index is 31.57 kg/m  as calculated from the following:    Height as of this encounter: 1.778 m (5' 10\").    Weight as of this encounter: 99.8 kg (220 lb).               Code Status  Full         Disposition  Observation, hope for discharge tomorrow               Chief Complaint:   Abdominal pain   History is obtained from the patient          History of Present Illness:   This patient is a 52 year old  male with a significant past medical history of migraines and anxiety/depression who presents with abdominal pain.    Has had prior flares of similar abdominal pain but they have resolved without intervention.  None recently however.  This time he had onset of symptoms 4 days ago after \"eating way to much\" that day.  Pain is mostly right upper quadrant, has persisted since 4 days ago but has gone up and down, at times up to a 9/10. Was an 8/10 on admission, down to a 2 after pain medications, now back up to a 4-5 with medications starting to wear off.  Some nausea and did vomit a couple of days ago, but not today.    No fever or chills. No other pain.    No recent changes in medications.    No prior surgeries besides tonsils as a kid, no trouble with anesthesia with that.  No personal history or family history of trouble with anesthesia or bleeding.  No recent changes in exercise tolerance.  No chest pain or dyspnea recently.      Non smoker, no alcohol or ilicit drug use.               Past Medical History:   I have reviewed this patient's past medical history.  Patient Active Problem List    Diagnosis Date Noted     Class 1 obesity without " serious comorbidity in adult, unspecified BMI, unspecified obesity type 04/07/2022     Priority: Medium     Generalized anxiety disorder 10/05/2010     Priority: Medium     Diagnosis updated by automated process. Provider to review and confirm.       Moderate episode of recurrent major depressive disorder (H) 06/22/2010     Priority: Medium     Dx 2-3 years ago        Migraine with aura and without status migrainosus, not intractable 06/22/2010     Priority: Medium     Dx 2 years        TMJ (temporomandibular joint syndrome) 06/22/2010     Priority: Medium              Past Surgical History:   I have reviewed this patient's past surgical history   Past Surgical History:   Procedure Laterality Date     TONSILLECTOMY               Social History:   I have reviewed this patient's social history   Social History     Tobacco Use     Smoking status: Never     Smokeless tobacco: Never   Substance Use Topics     Alcohol use: No     Comment: none             Family History:   I have reviewed this patient's family history   Family History   Problem Relation Age of Onset     Alcohol/Drug Mother         alcohol     C.A.D. Father         first MI age 37 yrs old-3 hearts attacks after that, open heart surgery     Diabetes Father      Hypertension Father      Alcohol/Drug Father         alcohol     Depression Father      Gastrointestinal Disease Father         gallbladder removed     Lipids Father      Gynecology Sister         fibroids             Immunizations:   Immunizations are current          Allergies:   No Known Allergies          Medications:   No current facility-administered medications on file prior to encounter.  SUMAtriptan (IMITREX) 100 MG tablet, Take 1 tablet (100 mg) by mouth at onset of headache for migraine May repeat in 2 hours if needed: max 2/day.               Review of Systems:    ROS: 10 point ROS neg other than the symptoms noted above in the HPI.           Physical Exam:   Blood pressure (!) 147/100,  "pulse 67, temperature 98.6  F (37  C), temperature source Tympanic, resp. rate 11, height 1.778 m (5' 10\"), weight 99.8 kg (220 lb), SpO2 95 %.  Temperatures:  Current - Temp: 98.6  F (37  C); Max - Temp  Av.6  F (37  C)  Min: 98.6  F (37  C)  Max: 98.6  F (37  C)  Respiration range: Resp  Av.3  Min: 7  Max: 20  Pulse range: Pulse  Av  Min: 67  Max: 116  Blood pressure range: Systolic (24hrs), Av , Min:100 , Max:150   ; Diastolic (24hrs), Av, Min:76, Max:105    Pulse oximetry range: SpO2  Av.5 %  Min: 95 %  Max: 99 %  No intake or output data in the 24 hours ending 23 [id number]  EXAM:  General: awake and alert, NAD, oriented x 3  Head: normocephalic  Neck: unremarkable, no lymphadenopathy   HEENT: oropharynx pink and moist    Heart: Regular rate and rhythm, no murmurs, rubs, or gallops  Lungs: clear to auscultation bilaterally with good air movement throughout  Abdomen: soft, non-tender, no masses or organomegaly  Extremities: no edema in lower extremities   Skin unremarkable.             Data:     Results for orders placed or performed during the hospital encounter of 23 (from the past 24 hour(s))   [place] Draw    Narrative    The following orders were created for panel order [place] Draw.  Procedure                               Abnormality         Status                     ---------                               -----------         ------                     Extra Blue Top Tube[[id number]]                              Final result               Extra Red Top Tube[[id number]]                               Final result               Extra Green Top (Lithium...[[id number]]                      Final result               Extra Purple Top Tube[[id number]]                            Final result                 Please view results for these tests on the individual orders.   Extra Blue Top Tube   Result Value Ref Range    Hold Specimen JIC    Extra Red Top Tube   Result Value Ref Range "    Hold Specimen JIC    Extra Green Top (Lithium Heparin) Tube   Result Value Ref Range    Hold Specimen jic    Extra Purple Top Tube   Result Value Ref Range    Hold Specimen JIC    CBC with platelets, differential    Narrative    The following orders were created for panel order CBC with platelets, differential.  Procedure                               Abnormality         Status                     ---------                               -----------         ------                     CBC with platelets and d...[[id number]]                      Final result                 Please view results for these tests on the individual orders.   Comprehensive metabolic panel   Result Value Ref Range    Sodium 140 136 - 145 mmol/L    Potassium 3.9 3.4 - 5.3 mmol/L    Chloride 102 98 - 107 mmol/L    Carbon Dioxide (CO2) 22 22 - 29 mmol/L    Anion Gap 16 (H) 7 - 15 mmol/L    Urea Nitrogen 11.3 6.0 - 20.0 mg/dL    Creatinine 0.98 0.67 - 1.17 mg/dL    Calcium 9.9 8.6 - 10.0 mg/dL    Glucose 131 (H) 70 - 99 mg/dL    Alkaline Phosphatase 99 40 - 129 U/L    AST 39 0 - 45 U/L    ALT 43 0 - 70 U/L    Protein Total 8.5 (H) 6.4 - 8.3 g/dL    Albumin 4.9 3.5 - 5.2 g/dL    Bilirubin Total 0.8 <=1.2 mg/dL    GFR Estimate >90 >60 mL/min/1.73m2   Lipase   Result Value Ref Range    Lipase 29 13 - 60 U/L   Troponin T, High Sensitivity   Result Value Ref Range    Troponin T, High Sensitivity <6 <=22 ng/L   CBC with platelets and differential   Result Value Ref Range    WBC Count 10.8 4.0 - 11.0 10e3/uL    RBC Count 5.61 4.40 - 5.90 10e6/uL    Hemoglobin 15.9 13.3 - 17.7 g/dL    Hematocrit 46.8 40.0 - 53.0 %    MCV 83 78 - 100 fL    MCH 28.3 26.5 - 33.0 pg    MCHC 34.0 31.5 - 36.5 g/dL    RDW 13.2 10.0 - 15.0 %    Platelet Count 207 150 - 450 10e3/uL    % Neutrophils 70 %    % Lymphocytes 20 %    % Monocytes 7 %    % Eosinophils 2 %    % Basophils 1 %    % Immature Granulocytes 0 %    NRBCs per 100 WBC 0 <1 /100    Absolute Neutrophils 7.5 1.6 -  8.3 10e3/uL    Absolute Lymphocytes 2.2 0.8 - 5.3 10e3/uL    Absolute Monocytes 0.7 0.0 - 1.3 10e3/uL    Absolute Eosinophils 0.2 0.0 - 0.7 10e3/uL    Absolute Basophils 0.1 0.0 - 0.2 10e3/uL    Absolute Immature Granulocytes 0.0 <=0.4 10e3/uL    Absolute NRBCs 0.0 10e3/uL   CT Abdomen Pelvis w Contrast    Narrative    EXAM: CT ABDOMEN PELVIS W CONTRAST  LOCATION: [facility]  DATE: 6/26/2023    INDICATION: Upper abdominal pain since Friday.  COMPARISON: None.  TECHNIQUE: CT scan of the abdomen and pelvis was performed following injection of IV contrast. Multiplanar reformats were obtained. Dose reduction techniques were used.  CONTRAST: 100 mL Isovue 370    FINDINGS:   LOWER CHEST: Normal.    HEPATOBILIARY: Gallstones seen impacted in the gallbladder neck with a distended gallbladder. No adjacent inflammatory change.    PANCREAS: Normal.    SPLEEN: Normal.    ADRENAL GLANDS: Normal.    KIDNEYS/BLADDER: Normal.    BOWEL: Normal.    LYMPH NODES: Normal.    VASCULATURE: Unremarkable.    PELVIC ORGANS: Normal.    MUSCULOSKELETAL: Normal.      Impression    IMPRESSION:   1.  Cholelithiasis with gallstones seen impacted in the gallbladder neck and distended gallbladder. Consider evaluation with ultrasound to exclude developing cholecystitis versus symptomatic cholelithiasis.  2.  No obstruction, colitis, diverticulitis, or appendicitis.  3.  No obstructing renal or ureteral stones. No hydroureteronephrosis.   Abdomen US, limited (RUQ only)    Narrative    EXAM: US ABDOMEN LIMITED  LOCATION: [facility]  DATE: 6/26/2023    INDICATION: ReSound recommended after CT scan findings  COMPARISON: 06/26/2023  TECHNIQUE: Limited abdominal ultrasound.    FINDINGS:    GALLBLADDER: Mobile sludge as well as an immobile stone is seen in the gallbladder neck measuring up to 2.7 cm.    BILE DUCTS: No biliary dilatation. The common duct measures 6 mm.    LIVER: Increased echogenicity  from diffuse fatty infiltration. Hypoechogenic region in the right hepatic lobe measuring up to 2.8 cm could reflect focal fatty sparing.    RIGHT KIDNEY: No hydronephrosis.    PANCREAS: The pancreas is largely obscured by overlying gas.    No ascites.      Impression    IMPRESSION:  1.  Large amount of sludge in the gallbladder with an impacted stone in the gallbladder neck measuring 2.7 cm. No convincing evidence of cholecystitis. However, a HIDA scan can be considered to evaluate for cystic duct obstruction.  2.  Hepatic steatosis. Hypoechogenic lesion in the right hepatic lobe measuring up to 2.8 cm could reflect focal fatty sparing.     All cardiac studies reviewed by me.  All imaging studies reviewed by me.    Attestation:  I have reviewed today's vital signs, notes, medications, labs and imaging.  Amount of time performed on this history and physical: 45 minutes.        Ivan Novak MD

## 2023-06-27 NOTE — TELEPHONE ENCOUNTER
Type of surgery:CHOLECYSTECTOMY, LAPAROSCOPIC (N/A)     Location of surgery: [place] [facility]  Date and time of surgery: 6/27  Surgeon: Preethi   Pre-Op Appt Date: emergent   Post-Op Appt Date: will  Call    Packet sent out: No  Pre-cert/Authorization completed:  No  Date:

## 2023-06-27 NOTE — CONSULTS
"Dear Dr. frank    I have seen Mazin Herbert and as you know his chief complaint is RUQ pain usually caused by eating fatty foods.This has been going on for 3 days now.     HPI:  Patient is a 52 year old male  with complaints RUQ pain  The last episode the patient ate very fatty meal which started the symptoms        Review Of Systems  Respiratory: No shortness of breath, dyspnea on exertion, cough, or hemoptysis  Cardiovascular: has a septal defect but is not causing any  problems and negative  Gastrointestinal: negative  Endocrine: negative  10 point review of systems done and all others are negative other than above.   BP (!) 147/100   Pulse 67   Temp 98.6  F (37  C) (Tympanic)   Resp 11   Ht 1.778 m (5' 10\")   Wt 99.8 kg (220 lb)   SpO2 95%   BMI 31.57 kg/m      Past Medical History:   Diagnosis Date     Depressive disorder        Past Surgical History:   Procedure Laterality Date     TONSILLECTOMY         Social History     Socioeconomic History     Marital status: Patient Declined     Spouse name: Not on file     Number of children: Not on file     Years of education: Not on file     Highest education level: Not on file   Occupational History     Not on file   Tobacco Use     Smoking status: Never     Smokeless tobacco: Never   Vaping Use     Vaping Use: Never used   Substance and Sexual Activity     Alcohol use: No     Comment: none     Drug use: No     Sexual activity: Yes     Partners: Female   Other Topics Concern     Parent/sibling w/ CABG, MI or angioplasty before 65F 55M? Yes     Comment: father-MI age 37 yrs old   Social History Narrative     Not on file     Social Determinants of Health     Financial Resource Strain: Not on file   Food Insecurity: Not on file   Transportation Needs: Not on file   Physical Activity: Not on file   Stress: Not on file   Social Connections: Not on file   Intimate Partner Violence: Not on file   Housing Stability: Not on file       Current Outpatient " Medications   Medication Sig Dispense Refill     SUMAtriptan (IMITREX) 100 MG tablet Take 1 tablet (100 mg) by mouth at onset of headache for migraine May repeat in 2 hours if needed: max 2/day. 12 tablet 11     Physical exam:  Patient able to get up on table without difficulty.  Head eyes, nose and mouth within normal limits.  .  Lungs are clear to auscultation  Heart is regular rate and rhythm with no murmur or thrills noted.  No costal vertebral angle tenderness noted.  Abdomen is abdomen is soft without significant tenderness, masses, organomegaly or guarding  bowel sounds are positive and no caput medusa noted.    Lower extremity edema is not present.  Skin warm and pink    Labs show:             Component Ref Range & Units  4:15 PM 10 yr ago 12 yr ago 13 yr ago    WBC Count 4.0 - 11.0 10e3/uL 10.8  10.2 R  10.1 R  15.4 High  R     RBC Count 4.40 - 5.90 10e6/uL 5.61  5.90 R  5.22 R  6.02 High  R     Hemoglobin 13.3 - 17.7 g/dL 15.9  16.5  14.8  16.6     Hematocrit 40.0 - 53.0 % 46.8  48.4  42.5  47.9     MCV 78 - 100 fL 83  82 R  81 R  80 R     MCH 26.5 - 33.0 pg 28.3  28.0  28.4  27.6     MCHC 31.5 - 36.5 g/dL 34.0  34.1  34.8  34.7     RDW 10.0 - 15.0 % 13.2  13.9  13.6  13.9     Platelet Count 150 - 450 10e3/uL 207  160 R  220 R  211 R     % Neutrophils % 70  73.7 R  60.4 R  91 High  R     % Lymphocytes % 20  12.5 Low  R  27.3 R  3 Low  R     % Monocytes % 7  13.0 High  R  8.7 R  6 R     % Eosinophils % 2  0.4 R  2.6 R  0 R     % Basophils % 1  0.3 R  1.0 R  0 R     % Immature Granulocytes % 0        NRBCs per 100 WBC <1 /100 0        Absolute Neutrophils 1.6 - 8.3 10e3/uL 7.5        Absolute Lymphocytes 0.8 - 5.3 10e3/uL 2.2        Absolute Monocytes 0.0 - 1.3 10e3/uL 0.7        Absolute Eosinophils 0.0 - 0.7 10e3/uL 0.2        Absolute Basophils 0.0 - 0.2 10e3/uL 0.1        Absolute Immature Granulocytes <=0.4 10e3/uL 0.0        Absolute NRBCs 10e3/uL 0.0       Resulting Agency              Result Notes              Component Ref Range & Units  4:15 PM  (6/26/23) 10 yr ago  (3/29/13) 13 yr ago  (3/2/10) 13 yr ago  (3/2/10)    Sodium 136 - 145 mmol/L 140  138 R   139 R     Potassium 3.4 - 5.3 mmol/L 3.9        Chloride 98 - 107 mmol/L 102        Carbon Dioxide (CO2) 22 - 29 mmol/L 22        Anion Gap 7 - 15 mmol/L 16 High   16 R   14 R     Urea Nitrogen 6.0 - 20.0 mg/dL 11.3        Creatinine 0.67 - 1.17 mg/dL 0.98  0.96 R   0.82 R, CM     Calcium 8.6 - 10.0 mg/dL 9.9  8.9 R   9.8 R     Glucose 70 - 99 mg/dL 131 High         Alkaline Phosphatase 40 - 129 U/L 99        AST 0 - 45 U/L 39   54 R     Comment: Reference intervals for this test were updated on 6/12/2023 to more accurately reflect our healthy population. There may be differences in the flagging of prior results with similar values performed with this method. Interpretation of those prior results can be made in the context of the updated reference intervals.    ALT 0 - 70 U/L 43   67     Comment: Reference intervals for this test were updated on 6/12/2023 to more accurately reflect our healthy population. There may be differences in the flagging of prior results with similar values performed with this method. Interpretation of those prior results can be made in the context of the updated reference intervals.      Protein Total 6.4 - 8.3 g/dL 8.5 High    9.0 High  R      Albumin 3.5 - 5.2 g/dL 4.9        Bilirubin Total <=1.2 mg/dL 0.8   0.9 R      GFR Estimate >60 mL/min/1.73m2 >90  87 R   >90 R    Resulting Agency  [facility]              Specimen Collected: 06/26/23  4:15 PM           Result    Narrative & Impression   EXAM: US ABDOMEN LIMITED  LOCATION: [facility]  DATE: 6/26/2023     INDICATION: ReSound recommended after CT scan findings  COMPARISON: 06/26/2023  TECHNIQUE: Limited abdominal  ultrasound.     FINDINGS:     GALLBLADDER: Mobile sludge as well as an immobile stone is seen in the gallbladder neck measuring up to 2.7 cm.     BILE DUCTS: No biliary dilatation. The common duct measures 6 mm.     LIVER: Increased echogenicity from diffuse fatty infiltration. Hypoechogenic region in the right hepatic lobe measuring up to 2.8 cm could reflect focal fatty sparing.     RIGHT KIDNEY: No hydronephrosis.     PANCREAS: The pancreas is largely obscured by overlying gas.     No ascites.                                                                      IMPRESSION:  1.  Large amount of sludge in the gallbladder with an impacted stone in the gallbladder neck measuring 2.7 cm. No convincing evidence of cholecystitis. However, a HIDA scan can be considered to evaluate for cystic duct obstruction.  2.  Hepatic steatosis. Hypoechogenic lesion in the right hepatic lobe measuring up to 2.8 cm could reflect focal fatty sparing.     Order-Level Documents:    Study Result    Narrative & Impression   EXAM: CT ABDOMEN PELVIS W CONTRAST  LOCATION: [facility]  DATE: 6/26/2023     INDICATION: Upper abdominal pain since Friday.  COMPARISON: None.  TECHNIQUE: CT scan of the abdomen and pelvis was performed following injection of IV contrast. Multiplanar reformats were obtained. Dose reduction techniques were used.  CONTRAST: 100 mL Isovue 370     FINDINGS:   LOWER CHEST: Normal.     HEPATOBILIARY: Gallstones seen impacted in the gallbladder neck with a distended gallbladder. No adjacent inflammatory change.     PANCREAS: Normal.     SPLEEN: Normal.     ADRENAL GLANDS: Normal.     KIDNEYS/BLADDER: Normal.     BOWEL: Normal.     LYMPH NODES: Normal.     VASCULATURE: Unremarkable.     PELVIC ORGANS: Normal.     MUSCULOSKELETAL: Normal.                                                                      IMPRESSION:   1.  Cholelithiasis with gallstones seen impacted in the gallbladder neck and  distended gallbladder. Consider evaluation with ultrasound to exclude developing cholecystitis versus symptomatic cholelithiasis.  2.  No obstruction, colitis, diverticulitis, or appendicitis.  3.  No obstructing renal or ureteral stones. No hydroureteronephrosis.       Utrasound shows + stones -dialted ducts -  thickening fo the gallbladder .   Patient denies any symptoms of common bile duct stone, or pancreatitis.    Assessment:  cholelithiasis with right upper quadrant pain  Plan to do laparoscopic cholecystectomy possible open.  We discussed typical gallbladder symptoms and I drew out a diagram to help discuss how the gallbladder works and what are the symptoms of a gallbladder attack.  I also discussed gallbladder surgery with risks and complications of surgery including bleeding, hernias, damage to bowel , damage to the bile ducts, risks of anesthesia.  If I get into bleeding that I can not control laparoscopicly, if I get a hole in the bowel or if the anatomy of the bile ducts does not look normal I may have to convert to an open procedure.  Discussed what to expect afterwards, the use of the abdominal binder and no lifting greater than 20 pounds for 3 weeks after surgery. That if done laparoscopically will plan on her going home the same day, but if I have to convert to an open procedure will have patient admitted to the hospital.        Time spent with patient and family with greater than 50% of the time in discussion was 50 minutes. This also included looking at films and looking over the chart and discussion with other physicians involved with the patient care.    Leonel Oro MD

## 2023-06-28 ENCOUNTER — PATIENT OUTREACH (OUTPATIENT)
Dept: FAMILY MEDICINE | Facility: CLINIC | Age: 53
End: 2023-06-28
Payer: COMMERCIAL

## 2023-06-28 LAB
PATH REPORT.COMMENTS IMP SPEC: NORMAL
PATH REPORT.COMMENTS IMP SPEC: NORMAL
PATH REPORT.FINAL DX SPEC: NORMAL
PATH REPORT.GROSS SPEC: NORMAL
PATH REPORT.MICROSCOPIC SPEC OTHER STN: NORMAL
PATH REPORT.RELEVANT HX SPEC: NORMAL
PHOTO IMAGE: NORMAL

## 2023-06-28 NOTE — TELEPHONE ENCOUNTER
ED / Discharge Outreach Protocol    Patient Contact    Attempt # 1    Was call answered?  No.  Left message on voicemail with information to call me back.  Avelina Harris RN

## 2023-07-02 LAB — BACTERIA FLD CULT: NO GROWTH

## 2023-07-04 LAB — BACTERIA FLD CULT: NORMAL

## 2023-07-17 ENCOUNTER — TRANSFERRED RECORDS (OUTPATIENT)
Dept: HEALTH INFORMATION MANAGEMENT | Facility: CLINIC | Age: 53
End: 2023-07-17
Payer: COMMERCIAL

## 2023-07-18 ENCOUNTER — OFFICE VISIT (OUTPATIENT)
Dept: SURGERY | Facility: CLINIC | Age: 53
End: 2023-07-18
Payer: COMMERCIAL

## 2023-07-18 VITALS
BODY MASS INDEX: 29.51 KG/M2 | TEMPERATURE: 97.4 F | HEART RATE: 76 BPM | HEIGHT: 71 IN | SYSTOLIC BLOOD PRESSURE: 115 MMHG | WEIGHT: 210.76 LBS | DIASTOLIC BLOOD PRESSURE: 76 MMHG

## 2023-07-18 DIAGNOSIS — Z90.49 S/P LAPAROSCOPIC CHOLECYSTECTOMY: Primary | ICD-10-CM

## 2023-07-18 PROCEDURE — 99024 POSTOP FOLLOW-UP VISIT: CPT | Performed by: SURGERY

## 2023-07-18 ASSESSMENT — PAIN SCALES - GENERAL: PAINLEVEL: MILD PAIN (2)

## 2023-07-18 NOTE — LETTER
"    7/18/2023         RE: Mazin Herbert  [id number]  [place] [address]        Dear Colleague,    Thank you for referring your patient, Mazin Herbert, to the [facility]. Please see a copy of my visit note below.    General Surgery Post Op    Pt returns for follow up visit s/p laparoscopic cholecystectomy on 6/27/23.    Patient has been doing well, tolerating diet. Bowels moving well. Pain controlled. No issues with wound healing/redness/drainage. No fevers.      Physical exam: Vitals: /76 (BP Location: Right arm, Patient Position: Sitting, Cuff Size: Adult Large)   Pulse 76   Temp 97.4  F (36.3  C) (Tympanic)   Ht 1.803 m (5' 10.98\")   Wt 95.6 kg (210 lb 12.2 oz)   BMI 29.41 kg/m    BMI= Body mass index is 29.41 kg/m .    Exam:  Constitutional: healthy, alert, and no distress  Cardiovascular: negative  Respiratory: negative  Gastrointestinal: Abdomen soft, non-tender. BS normal. No masses, organomegaly  Incisions C/D/I    Path:  Final Diagnosis   A(1). Gallbladder, cholecystectomy:  -Acute cholecystitis, and cholelithiasis.  -Negative for dysplasia or malignancy.     Assessment:     ICD-10-CM    1. S/P laparoscopic cholecystectomy  Z90.49         Plan: Mazin Herbert was seen for follow-up after laparoscopic cholecystectomy.  Patient is doing well and recovering without issue at this time.  We reviewed the pathology which was benign.  We discussed routine post-operative care of wounds including avoiding sun exposure to wounds to limit scarring, no need for overlying ointments, and weight restrictions going forward.  Patient was instructed to call with any questions or concerns.  Mazin Herbert can follow-up on an as needed basis.    Reviewed his early inguinal hernia imaged, difficult to assess how far they tracked.  Given lack of symptoms, I advised conservative management. Discussed signs/symptoms of hernia with patient and " instructions to return.    Juma Mendenhall DO on 7/18/2023 at 7:37 AM        Again, thank you for allowing me to participate in the care of your patient.        Sincerely,        Juma Mendenhall DO

## 2023-07-18 NOTE — LETTER
July 18, 2023      Mazin BROOKS Ronnsonyakishadaron  [id number]  [place] [facility] [address]        To Whom It May Concern:    Mazin BROOKS Peterdaron was seen in our clinic. He may return to work 7/25/23 without restriction.      Sincerely,        Juma Mendenhall, DO

## 2023-07-18 NOTE — NURSING NOTE
"Initial /76 (BP Location: Right arm, Patient Position: Sitting, Cuff Size: Adult Large)   Pulse 76   Temp 97.4  F (36.3  C) (Tympanic)   Ht 1.803 m (5' 10.98\")   Wt 95.6 kg (210 lb 12.2 oz)   BMI 29.41 kg/m   Estimated body mass index is 29.41 kg/m  as calculated from the following:    Height as of this encounter: 1.803 m (5' 10.98\").    Weight as of this encounter: 95.6 kg (210 lb 12.2 oz). .\  Lucrecia Tomlinson MA    "

## 2023-07-18 NOTE — PROGRESS NOTES
"General Surgery Post Op    Pt returns for follow up visit s/p laparoscopic cholecystectomy on 6/27/23.    Patient has been doing well, tolerating diet. Bowels moving well. Pain controlled. No issues with wound healing/redness/drainage. No fevers.      Physical exam: Vitals: /76 (BP Location: Right arm, Patient Position: Sitting, Cuff Size: Adult Large)   Pulse 76   Temp 97.4  F (36.3  C) (Tympanic)   Ht 1.803 m (5' 10.98\")   Wt 95.6 kg (210 lb 12.2 oz)   BMI 29.41 kg/m    BMI= Body mass index is 29.41 kg/m .    Exam:  Constitutional: healthy, alert, and no distress  Cardiovascular: negative  Respiratory: negative  Gastrointestinal: Abdomen soft, non-tender. BS normal. No masses, organomegaly  Incisions C/D/I    Path:  Final Diagnosis   A(1). Gallbladder, cholecystectomy:  -Acute cholecystitis, and cholelithiasis.  -Negative for dysplasia or malignancy.     Assessment:     ICD-10-CM    1. S/P laparoscopic cholecystectomy  Z90.49         Plan: Mazin Herbert was seen for follow-up after laparoscopic cholecystectomy.  Patient is doing well and recovering without issue at this time.  We reviewed the pathology which was benign.  We discussed routine post-operative care of wounds including avoiding sun exposure to wounds to limit scarring, no need for overlying ointments, and weight restrictions going forward.  Patient was instructed to call with any questions or concerns.  Mazin Herbert can follow-up on an as needed basis.    Reviewed his early inguinal hernia imaged, difficult to assess how far they tracked.  Given lack of symptoms, I advised conservative management. Discussed signs/symptoms of hernia with patient and instructions to return.    Juma Mendenhall, DO on 7/18/2023 at 7:37 AM      "

## 2023-07-27 ENCOUNTER — OFFICE VISIT (OUTPATIENT)
Dept: FAMILY MEDICINE | Facility: CLINIC | Age: 53
End: 2023-07-27
Payer: COMMERCIAL

## 2023-07-27 VITALS
BODY MASS INDEX: 30.41 KG/M2 | WEIGHT: 212.4 LBS | HEIGHT: 70 IN | HEART RATE: 78 BPM | TEMPERATURE: 99.1 F | OXYGEN SATURATION: 98 % | SYSTOLIC BLOOD PRESSURE: 110 MMHG | RESPIRATION RATE: 14 BRPM | DIASTOLIC BLOOD PRESSURE: 76 MMHG

## 2023-07-27 DIAGNOSIS — Z12.11 SCREEN FOR COLON CANCER: ICD-10-CM

## 2023-07-27 DIAGNOSIS — K81.9 CHOLECYSTITIS: Primary | ICD-10-CM

## 2023-07-27 DIAGNOSIS — Z23 ENCOUNTER FOR HERPES ZOSTER VACCINATION: ICD-10-CM

## 2023-07-27 DIAGNOSIS — K64.4 EXTERNAL HEMORRHOIDS: ICD-10-CM

## 2023-07-27 PROBLEM — G89.29 CHRONIC RUQ PAIN: Status: RESOLVED | Noted: 2023-06-26 | Resolved: 2023-07-27

## 2023-07-27 PROBLEM — K80.20 GALLSTONES: Status: RESOLVED | Noted: 2023-06-26 | Resolved: 2023-07-27

## 2023-07-27 PROBLEM — R10.11 CHRONIC RUQ PAIN: Status: RESOLVED | Noted: 2023-06-26 | Resolved: 2023-07-27

## 2023-07-27 PROBLEM — K80.20 SYMPTOMATIC CHOLELITHIASIS: Status: RESOLVED | Noted: 2023-06-26 | Resolved: 2023-07-27

## 2023-07-27 PROCEDURE — 90750 HZV VACC RECOMBINANT IM: CPT | Performed by: PHYSICIAN ASSISTANT

## 2023-07-27 PROCEDURE — 99213 OFFICE O/P EST LOW 20 MIN: CPT | Mod: 25 | Performed by: PHYSICIAN ASSISTANT

## 2023-07-27 PROCEDURE — 90471 IMMUNIZATION ADMIN: CPT | Performed by: PHYSICIAN ASSISTANT

## 2023-07-27 NOTE — LETTER
July 27, 2023      Mazin Herbert  [id number]  [facility] [facility] [address]        To Whom It May Concern:    Mazin Herbert was seen in our clinic. He should be excused from work on 7/26-7/28 due to medical reasons.  He may return to work without restrictions.      Sincerely,        Karen Marquez PA-C

## 2023-07-27 NOTE — PROGRESS NOTES
"  Assessment & Plan     Cholecystitis  Work note provided as he wasn't yet ready to return post surgery    External hemorrhoids  Banded x 1, plans to return for further banding.  He wants to cancel colonoscopy if hemorrhoids not resolved.  I discouraged rescheduling colonoscopy for next yr given he has not yet had any screening.      Screen for colon cancer  Encouraged FIT this yr if cancels his colonoscopy    Encounter for herpes zoster vaccination      Patient Instructions   2nd shingles vaccine dose today     Upcoming colonoscopy - if you cancel, I suggest the mail the poop test     Recommend cholesterol test sometime    Karen Marquez PA-C  [facility]    Harsh Retana is a 52 year old, presenting for the following health issues:  Letter for School/Work and Imm/Inj        7/27/2023     3:04 PM   Additional Questions   Roomed by Rica CUETO MA   Accompanied by Self       History of Present Illness       Reason for visit:  Shingle vaccine second dose    He eats 4 or more servings of fruits and vegetables daily.He consumes 1 sweetened beverage(s) daily.He exercises with enough effort to increase his heart rate 30 to 60 minutes per day.  He exercises with enough effort to increase his heart rate 4 days per week.      Letter-  Would like a letter written to be off 7/26/23, 7/27/23, 7/28/23.  Says he had gall bladder surgery and they told him he should be good to go back to work on 7/20/23, but he couldn't.  Is hoping for a letter    Would like his ears checked, feels like they are full of wax.    2nd dose shingles vaccine    Got hemorrhoid banded, thinks he needs to return for another banding.  Colonoscopy scheduled 9/11      Objective    /76 (BP Location: Right arm, Patient Position: Sitting, Cuff Size: Adult Large)   Pulse 78   Temp 99.1  F (37.3  C) (Tympanic)   Resp 14   Ht 1.778 m (5' 10\")   Wt 96.3 kg (212 lb 6.4 oz)   SpO2 98%   BMI 30.48 kg/m    Body mass " index is 30.48 kg/m .

## 2023-07-27 NOTE — PATIENT INSTRUCTIONS
2nd shingles vaccine dose today     Upcoming colonoscopy - if you cancel, I suggest the mail the poop test     Recommend cholesterol test sometime

## 2023-10-21 ENCOUNTER — OFFICE VISIT (OUTPATIENT)
Dept: URGENT CARE | Facility: URGENT CARE | Age: 53
End: 2023-10-21
Payer: COMMERCIAL

## 2023-10-21 VITALS
OXYGEN SATURATION: 98 % | TEMPERATURE: 97.7 F | WEIGHT: 215 LBS | BODY MASS INDEX: 30.85 KG/M2 | SYSTOLIC BLOOD PRESSURE: 134 MMHG | DIASTOLIC BLOOD PRESSURE: 92 MMHG | RESPIRATION RATE: 17 BRPM | HEART RATE: 76 BPM
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
## 2023-10-21 PROCEDURE — 99213 OFFICE O/P EST LOW 20 MIN: CPT | Mod: 25 | Performed by: NURSE PRACTITIONER

## 2023-10-21 PROCEDURE — 90715 TDAP VACCINE 7 YRS/> IM: CPT | Performed by: NURSE PRACTITIONER

## 2023-10-21 PROCEDURE — 90471 IMMUNIZATION ADMIN: CPT | Performed by: NURSE PRACTITIONER

## 2023-10-21 NOTE — PATIENT INSTRUCTIONS
Monitor closely for signs of infection.  Redness, increased swelling, and purulent drainage.   Be seen again or message your care team for signs of infection.

## 2023-10-21 NOTE — PROGRESS NOTES
SUBJECTIVE:   Mazin Herbert is a 53 year old male presenting with a chief complaint of   Chief Complaint   Patient presents with    Trauma     Got bit by dog yesterday. Right forearm.      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   Wound was cleaned with peroxide, and antibiotic ointment.     Past Medical History:   Diagnosis Date    Depressive disorder      Family History   Problem Relation Age of Onset    Alcohol/Drug Mother         alcohol    C.A.D. Father         first MI age 37 yrs old-3 hearts attacks after that, open heart surgery    Diabetes Father     Hypertension Father     Alcohol/Drug Father         alcohol    Depression Father     Gastrointestinal Disease Father         gallbladder removed    Lipids Father     Gynecology Sister         fibroids     Current Outpatient Medications   Medication Sig Dispense Refill    SUMAtriptan (IMITREX) 100 MG tablet Take 1 tablet (100 mg) by mouth at onset of headache for migraine May repeat in 2 hours if needed: max 2/day. 12 tablet 11    ibuprofen (ADVIL/MOTRIN) 200 MG tablet Take 1 tablet (200 mg) by mouth every 6 hours as needed for other (mild pain) (Patient not taking: Reported on 10/21/2023) 30 tablet 0    polyethylene glycol (MIRALAX) 17 g packet Take 17 g by mouth daily as needed for constipation (Patient not taking: Reported on 10/21/2023)      sennosides (SENOKOT) 8.6 MG tablet Take 1-2 tablets by mouth 2 times daily as needed for constipation (Patient not taking: Reported on 10/21/2023)       Social History     Tobacco Use    Smoking status: Never    Smokeless tobacco: Never   Substance Use Topics    Alcohol use: No     Comment: none       OBJECTIVE  BP (!) 134/92   Pulse 76   Temp 97.7  F (36.5  C) (Tympanic)   Resp 17   Wt 97.5 kg (215 lb)   SpO2 98%   BMI 30.85 kg/m      Physical Exam  Musculoskeletal:        Arms:       Comments: Puncture wound from dog bite. Slight bruising started. No redness, drainage or  swelling today.     Tetanus booster given in clinic.    ASSESSMENT:  1. Dog bite, initial encounter  TDAP given    PLAN:  Monitor closely for signs of infection.  Redness, increased swelling, and purulent drainage.   Be seen again or message your care team for signs of infection.

## 2024-03-21 ENCOUNTER — OFFICE VISIT (OUTPATIENT)
Dept: FAMILY MEDICINE | Facility: CLINIC | Age: 54
End: 2024-03-21
Payer: COMMERCIAL

## 2024-03-21 VITALS
TEMPERATURE: 98.1 F | SYSTOLIC BLOOD PRESSURE: 126 MMHG | BODY MASS INDEX: 31.22 KG/M2 | WEIGHT: 223 LBS | RESPIRATION RATE: 18 BRPM | DIASTOLIC BLOOD PRESSURE: 72 MMHG | HEIGHT: 71 IN | OXYGEN SATURATION: 100 % | HEART RATE: 83 BPM

## 2024-03-21 DIAGNOSIS — H93.8X2 EAR POPPING, LEFT: Primary | ICD-10-CM

## 2024-03-21 DIAGNOSIS — M26.609 TMJ (TEMPOROMANDIBULAR JOINT SYNDROME): ICD-10-CM

## 2024-03-21 DIAGNOSIS — G43.109 MIGRAINE WITH AURA AND WITHOUT STATUS MIGRAINOSUS, NOT INTRACTABLE: ICD-10-CM

## 2024-03-21 DIAGNOSIS — F41.1 GENERALIZED ANXIETY DISORDER: ICD-10-CM

## 2024-03-21 PROCEDURE — 99214 OFFICE O/P EST MOD 30 MIN: CPT | Performed by: PHYSICIAN ASSISTANT

## 2024-03-21 RX ORDER — SUMATRIPTAN 100 MG/1
100 TABLET, FILM COATED ORAL
Qty: 12 TABLET | Refills: 4 | Status: SHIPPED | OUTPATIENT
Start: 2024-03-21

## 2024-03-21 RX ORDER — ESCITALOPRAM OXALATE 10 MG/1
10 TABLET ORAL DAILY
Qty: 30 TABLET | Refills: 11 | Status: SHIPPED | OUTPATIENT
Start: 2024-03-21

## 2024-03-21 ASSESSMENT — PATIENT HEALTH QUESTIONNAIRE - PHQ9
10. IF YOU CHECKED OFF ANY PROBLEMS, HOW DIFFICULT HAVE THESE PROBLEMS MADE IT FOR YOU TO DO YOUR WORK, TAKE CARE OF THINGS AT HOME, OR GET ALONG WITH OTHER PEOPLE: SOMEWHAT DIFFICULT
SUM OF ALL RESPONSES TO PHQ QUESTIONS 1-9: 11
SUM OF ALL RESPONSES TO PHQ QUESTIONS 1-9: 11

## 2024-03-21 ASSESSMENT — ANXIETY QUESTIONNAIRES
IF YOU CHECKED OFF ANY PROBLEMS ON THIS QUESTIONNAIRE, HOW DIFFICULT HAVE THESE PROBLEMS MADE IT FOR YOU TO DO YOUR WORK, TAKE CARE OF THINGS AT HOME, OR GET ALONG WITH OTHER PEOPLE: SOMEWHAT DIFFICULT
GAD7 TOTAL SCORE: 12
8. IF YOU CHECKED OFF ANY PROBLEMS, HOW DIFFICULT HAVE THESE MADE IT FOR YOU TO DO YOUR WORK, TAKE CARE OF THINGS AT HOME, OR GET ALONG WITH OTHER PEOPLE?: SOMEWHAT DIFFICULT
GAD7 TOTAL SCORE: 12
4. TROUBLE RELAXING: NOT AT ALL
7. FEELING AFRAID AS IF SOMETHING AWFUL MIGHT HAPPEN: MORE THAN HALF THE DAYS
GAD7 TOTAL SCORE: 12
1. FEELING NERVOUS, ANXIOUS, OR ON EDGE: NEARLY EVERY DAY
3. WORRYING TOO MUCH ABOUT DIFFERENT THINGS: NEARLY EVERY DAY
2. NOT BEING ABLE TO STOP OR CONTROL WORRYING: NEARLY EVERY DAY
7. FEELING AFRAID AS IF SOMETHING AWFUL MIGHT HAPPEN: MORE THAN HALF THE DAYS
6. BECOMING EASILY ANNOYED OR IRRITABLE: SEVERAL DAYS
5. BEING SO RESTLESS THAT IT IS HARD TO SIT STILL: NOT AT ALL

## 2024-03-21 ASSESSMENT — PAIN SCALES - GENERAL: PAINLEVEL: NO PAIN (0)

## 2024-03-21 NOTE — NURSING NOTE
"Chief Complaint   Patient presents with    Headache    Depression    Anxiety       Initial Pulse 83   SpO2 100%  Estimated body mass index is 30.85 kg/m  as calculated from the following:    Height as of 7/27/23: 1.778 m (5' 10\").    Weight as of 10/21/23: 97.5 kg (215 lb).    Patient presents to the clinic using No DME    Is there anyone who you would like to be able to receive your results? No  If yes have patient fill out PRICE      "

## 2024-03-21 NOTE — PROGRESS NOTES
"  Assessment & Plan     Ear popping, left  No infection or wax or effusion.      TMJ (temporomandibular joint syndrome)  Definite TMJ today but unclear that this is cause of his main symptom of ear popping     Generalized anxiety disorder  Not optimized.  He wants to start a medication but didn't want to start if he was going to need a medication recheck.    - escitalopram (LEXAPRO) 10 MG tablet; Take 1 tablet (10 mg) by mouth daily    Migraine with aura and without status migrainosus, not intractable  Refill stable  - SUMAtriptan (IMITREX) 100 MG tablet; Take 1 tablet (100 mg) by mouth at onset of headache for migraine May repeat in 2 hours if needed: max 2/day.    Lipid screening  Patient declines today    Patient Instructions   No wax  Definitely you have TMJ - but popping unlikely from TMJ, is sometimes from sinus congestion - could try daily allergy pill (such as loratadine, cetirizine, or fexofenadine), or allergy nasal spray such as flonase.  Would try at least a week before deciding if they work.    Refilled migraine med    Decided to try lexapro for anxiety.  Can take 3-4 weeks to see benefit.  Initial side effects sometimes go away.   Recheck 4 weeks if not doing as well as you'd like.    BMI  Estimated body mass index is 31.12 kg/m  as calculated from the following:    Height as of this encounter: 1.803 m (5' 10.98\").    Weight as of this encounter: 101.2 kg (223 lb).   Weight management plan: Discussed healthy diet and exercise guidelines      Harsh   Uatsdin is a 53 year old, presenting for the following health issues:  Headache, Depression, and Anxiety        3/21/2024     5:13 PM   Additional Questions   Roomed by angella myers cma     History of Present Illness       Mental Health Follow-up:  Patient presents to follow-up on Depression & Anxiety.Patient's depression since last visit has been:  Medium  The patient is not having other symptoms associated with depression.  Patient's anxiety since last " "visit has been:  Worse  The patient is not having other symptoms associated with anxiety.  Any significant life events: other  Patient is feeling anxious or having panic attacks.  Patient has no concerns about alcohol or drug use.    Headaches:   Since the patient's last clinic visit, headaches are: improved  The patient is getting headaches:  FIVE TIMES A MONTH  He is able to do normal daily activities when he has a migraine.  The patient is taking the following rescue/relief medications:  Sumatriptan (Imitrex)   Patient states \"I get total relief\" from the rescue/relief medications.   The patient is taking the following medications to prevent migraines:  No medications to prevent migraines  In the past 4 weeks, the patient has gone to an Urgent Care or Emergency Room 0 times times due to headaches.    He eats 2-3 servings of fruits and vegetables daily.He consumes 1 sweetened beverage(s) daily.He exercises with enough effort to increase his heart rate 30 to 60 minutes per day.  He exercises with enough effort to increase his heart rate 3 or less days per week.      Left ear crackling for past 6 months.  Not really pain in jaw.  Hard open mouth wide.  No jaw locking or getting stuck.  He does note that when he sleeps on that side of his head, that he thinks his jaw juts out of place and sometimes triggers a migraine, but states that his migraines have actually been doing well lately.  Some stress - if he watches the news.  An uncle wondered about him trying lexapro.  Past meds - zoloft, prozac, cymbalta.  No known dental issues but last visit 3-4 yrs.        Objective    /72 (BP Location: Right arm, Patient Position: Sitting, Cuff Size: Adult Large)   Pulse 83   Temp 98.1  F (36.7  C) (Tympanic)   Resp 18   Ht 1.803 m (5' 10.98\")   Wt 101.2 kg (223 lb)   SpO2 100%   BMI 31.12 kg/m    Body mass index is 31.12 kg/m .    Signed Electronically by: Karen Marquez PA-C    "

## 2024-03-21 NOTE — PATIENT INSTRUCTIONS
No wax  Definitely you have TMJ - but popping unlikely from TMJ, is sometimes from sinus congestion - could try daily allergy pill (such as loratadine, cetirizine, or fexofenadine), or allergy nasal spray such as flonase.  Would try at least a week before deciding if they work.    Refilled migraine med    Decided to try lexapro for anxiety.  Can take 3-4 weeks to see benefit.  Initial side effects sometimes go away.   Recheck 4 weeks if not doing as well as you'd like.

## 2024-06-30 ENCOUNTER — HEALTH MAINTENANCE LETTER (OUTPATIENT)
Age: 54
End: 2024-06-30

## 2025-07-13 ENCOUNTER — HEALTH MAINTENANCE LETTER (OUTPATIENT)
Age: 55
End: 2025-07-13

## (undated) DEVICE — DRAPE TIBURON GENERAL ENDOSCOPY 9458

## (undated) DEVICE — GLOVE BIOGEL PI MICRO SZ 6.5 48565

## (undated) DEVICE — SYR 50ML LL W/O NDL 309653

## (undated) DEVICE — GLOVE BIOGEL PI ULTRATOUCH G SZ 7.5 42175

## (undated) DEVICE — GLOVE BIOGEL PI MICRO INDICATOR UNDERGLOVE SZ 6.5 48965

## (undated) DEVICE — ESU HOOK TIP 5MM CONMED

## (undated) DEVICE — PREP CHLORAPREP 26ML TINTED ORANGE  260815

## (undated) DEVICE — BNDG ABDOMINAL BINDER 9X45-62" 79-89071

## (undated) DEVICE — ENDO TROCAR SLEEVE KII Z-THREADED 05X100MM CTS02

## (undated) DEVICE — SU VICRYL 3-0 SH 27" UND J416H

## (undated) DEVICE — SOL ADH LIQUID BENZOIN SWAB 0.6ML C1544

## (undated) DEVICE — SU VICRYL 0 UR-6 27" J603H

## (undated) DEVICE — ESU HOLSTER PLASTIC DISP E2400

## (undated) DEVICE — DRSG TEGADERM 2 3/8X2 3/4" 1624W

## (undated) DEVICE — DRSG STERI STRIP 1/2X4" R1547

## (undated) DEVICE — SU VICRYL 0 TIE 54" UND J287G

## (undated) DEVICE — ENDO TROCAR FIRST ENTRY KII FIOS Z-THRD 11X100MM CTF33

## (undated) DEVICE — CLIP APPLIER ENDO 5MM M/L LIGAMAX EL5ML

## (undated) DEVICE — ENDO TROCAR FIRST ENTRY KII FIOS Z-THRD 05X100MM CTF03

## (undated) DEVICE — STOCKING SLEEVE COMPRESSION CALF MED

## (undated) DEVICE — ESU ENDO SCISSORS 5MM CVD 5DCS

## (undated) DEVICE — DRAPE POUCH INSTRUMENT 3 POCKET 1018L

## (undated) DEVICE — DEVICE SUTURE GRASPER TROCAR CLOSURE 14GA PMITCSG

## (undated) DEVICE — BNDG ABDOMINAL BINDER 12X26-50" 0814-0146

## (undated) DEVICE — GOWN XLG DISP 9545

## (undated) DEVICE — Device

## (undated) DEVICE — SU MONOCRYL 4-0 PS-2 18" UND Y496G

## (undated) DEVICE — SYSTEM LAPAROVUE VISIBILITY LAPVUE10

## (undated) DEVICE — ESU CORD MONOPOLAR 10'  E0510

## (undated) DEVICE — ESU SUCTION/IRRIGATION SYSTEM PISTOL GRIP

## (undated) DEVICE — ENDO POUCH UNIV RETRIEVAL SYSTEM INZII 10MM CD001

## (undated) RX ORDER — ONDANSETRON 2 MG/ML
INJECTION INTRAMUSCULAR; INTRAVENOUS
Status: DISPENSED
Start: 2023-06-27

## (undated) RX ORDER — FENTANYL CITRATE 50 UG/ML
INJECTION, SOLUTION INTRAMUSCULAR; INTRAVENOUS
Status: DISPENSED
Start: 2023-06-27

## (undated) RX ORDER — DEXAMETHASONE SODIUM PHOSPHATE 4 MG/ML
INJECTION, SOLUTION INTRA-ARTICULAR; INTRALESIONAL; INTRAMUSCULAR; INTRAVENOUS; SOFT TISSUE
Status: DISPENSED
Start: 2023-06-27

## (undated) RX ORDER — MAGNESIUM SULFATE HEPTAHYDRATE 40 MG/ML
INJECTION, SOLUTION INTRAVENOUS
Status: DISPENSED
Start: 2023-06-27

## (undated) RX ORDER — BUPIVACAINE HYDROCHLORIDE AND EPINEPHRINE 2.5; 5 MG/ML; UG/ML
INJECTION, SOLUTION EPIDURAL; INFILTRATION; INTRACAUDAL; PERINEURAL
Status: DISPENSED
Start: 2023-06-27

## (undated) RX ORDER — FENTANYL CITRATE-0.9 % NACL/PF 10 MCG/ML
PLASTIC BAG, INJECTION (ML) INTRAVENOUS
Status: DISPENSED
Start: 2023-06-27

## (undated) RX ORDER — PROPOFOL 10 MG/ML
INJECTION, EMULSION INTRAVENOUS
Status: DISPENSED
Start: 2023-06-27

## (undated) RX ORDER — CEFAZOLIN SODIUM 1 G/3ML
INJECTION, POWDER, FOR SOLUTION INTRAMUSCULAR; INTRAVENOUS
Status: DISPENSED
Start: 2023-06-27